# Patient Record
Sex: FEMALE | Race: WHITE | Employment: FULL TIME | URBAN - NONMETROPOLITAN AREA
[De-identification: names, ages, dates, MRNs, and addresses within clinical notes are randomized per-mention and may not be internally consistent; named-entity substitution may affect disease eponyms.]

---

## 2018-01-23 ENCOUNTER — ALLIED HEALTH/NURSE VISIT (OUTPATIENT)
Dept: FAMILY MEDICINE | Facility: OTHER | Age: 23
End: 2018-01-23
Payer: COMMERCIAL

## 2018-01-23 VITALS
HEART RATE: 111 BPM | BODY MASS INDEX: 36.07 KG/M2 | DIASTOLIC BLOOD PRESSURE: 60 MMHG | SYSTOLIC BLOOD PRESSURE: 120 MMHG | WEIGHT: 247.8 LBS

## 2018-01-23 DIAGNOSIS — N91.2 ABSENCE OF MENSTRUATION: Primary | ICD-10-CM

## 2018-01-23 LAB — BETA HCG QUAL IFA URINE: POSITIVE

## 2018-01-23 PROCEDURE — 99207 ZZC NO CHARGE NURSE ONLY: CPT

## 2018-01-23 PROCEDURE — 84703 CHORIONIC GONADOTROPIN ASSAY: CPT | Performed by: INTERNAL MEDICINE

## 2018-01-23 RX ORDER — PRENATAL VIT/IRON FUM/FOLIC AC 27MG-0.8MG
1 TABLET ORAL DAILY
COMMUNITY
End: 2018-09-10

## 2018-01-23 NOTE — MR AVS SNAPSHOT
"              After Visit Summary   1/23/2018    Mila Oden    MRN: [id number]           Patient Information     Date Of Birth          1995        Visit Information        Provider Department      1/23/2018 1:30 PM MC NURSE [facility]        Today's Diagnoses     Absence of menstruation    -  1       Follow-ups after your visit        Your next 10 appointments already scheduled     Jan 29, 2018  5:00 PM CST   [facility] Prenatal with [facility] OB [facility]   [facility]    [address] [place] [address] [phone]              Who to contact     If you have questions or need follow up information about today's clinic visit or your schedule please contact [facility] directly at [phone].  Normal or non-critical lab and imaging results will be communicated to you by LawPivothart, letter or phone within 4 business days after the clinic has received the results. If you do not hear from us within 7 days, please contact the clinic through LawPivothart or phone. If you have a critical or abnormal lab result, we will notify you by phone as soon as possible.  Submit refill requests through INcubes or call your pharmacy and they will forward the refill request to us. Please allow 3 business days for your refill to be completed.          Additional Information About Your Visit        LawPivothart Information     INcubes lets you send messages to your doctor, view your test results, renew your prescriptions, schedule appointments and more. To sign up, go to [email or web address] . Click on \"Log in\" on the left side of the screen, which will take you to the Welcome page. Then click on \"Sign up Now\" on the right side of the page.     You will be asked to enter the access code listed below, as well as some personal information. Please follow the directions to create your username and password.     Your access code is: [email or web address]  Expires: 4/23/2018  1:50 " PM     Your access code will  in 90 days. If you need help or a new code, please call your [place] clinic or [phone].        Care EveryWhere ID     This is your Care EveryWhere ID. This could be used by other organizations to access your [place] medical records  [id number]        Your Vitals Were     Pulse Last Period Breastfeeding? BMI (Body Mass Index)          111 11/15/2017 (Approximate) No 36.07 kg/m2         Blood Pressure from Last 3 Encounters:   18 120/60   16 105/65   16 108/72    Weight from Last 3 Encounters:   18 247 lb 12.8 oz (112.4 kg)   16 223 lb 1.6 oz (101.2 kg)   16 220 lb (99.8 kg)              We Performed the Following     Beta HCG qual IFA urine        Primary Care Provider Office Phone # Fax #    [facility] [phone]       [address] [facility] [address]        Equal Access to Services     SALLY WHIPPLE : Hadii aad ku hadasho Soomaali, waaxda luqadaha, qaybta kaalmada adeegyada, waxay rubioin reg morillo . So [facility] [phone].    ATENCIÓN: Si habla español, tiene a perez disposición servicios gratuitos de asistencia lingüística. Llame al [phone].    We comply with applicable federal civil rights laws and [place] laws. We do not discriminate on the basis of race, color, national origin, age, disability, sex, sexual orientation, or gender identity.            Thank you!     Thank you for choosing [facility]  for your care. Our goal is always to provide you with excellent care. Hearing back from our patients is one way we can continue to improve our services. Please take a few minutes to complete the written survey that you may receive in the mail after your visit with us. Thank you!             Your Updated Medication List - Protect others around you: Learn how to safely use, store and throw away your medicines at www.disposemymeds.org.          This list is accurate as of:  1/23/18  1:50 PM.  Always use your most recent med list.                   Brand Name Dispense Instructions for use Diagnosis    prenatal multivitamin plus iron 27-0.8 MG Tabs per tablet      Take 1 tablet by mouth daily

## 2018-01-23 NOTE — PROGRESS NOTES
Mila Oden is a 22 year old here today for a pregnancy test.  LMP: Patient's last menstrual period was 11/15/2017 (approximate).  Wt: 247 lbs 12.8 oz.    Symptoms include breast tenderness, absence of menses and nausea &/or vomiting.    Mila informed of positive pregnancy test results. VJIAY: 2018    Educational advice given: nutrition, smoking and drugs & alcohol.    Current medications reviewed: Yes    Previous pregnancy history remarkable for: None.    Plan: schedule appointment with OB Educator and/or OB class, follow-up appointment with Dr. stewart for pre- care, take multivitamin or pre-farooq vitamins and OB Education packet given.    She is to call back if she has any questions or concerns.  She is advised to notify a provider immediately if she experiences any severe cramping or abdominal pain or any vaginal bleeding.    Halle Morales RN  [facility]

## 2018-01-25 NOTE — PROGRESS NOTES
Please contact the patient and notify her of the following:  If the patient has not already been notified, please let her know that she is pregnant.    Thank you.   DO SAMREEN Hays

## 2018-01-29 ENCOUNTER — PRENATAL OFFICE VISIT (OUTPATIENT)
Dept: FAMILY MEDICINE | Facility: CLINIC | Age: 23
End: 2018-01-29
Payer: COMMERCIAL

## 2018-01-29 VITALS — HEIGHT: 69 IN | WEIGHT: 240 LBS | BODY MASS INDEX: 35.55 KG/M2

## 2018-01-29 DIAGNOSIS — Z34.90 PREGNANCY, UNSPECIFIED GESTATIONAL AGE: Primary | ICD-10-CM

## 2018-01-29 LAB
ALBUMIN UR-MCNC: NEGATIVE MG/DL
AMORPH CRY #/AREA URNS HPF: ABNORMAL /HPF
APPEARANCE UR: ABNORMAL
BILIRUB UR QL STRIP: NEGATIVE
COLOR UR AUTO: YELLOW
ERYTHROCYTE [DISTWIDTH] IN BLOOD BY AUTOMATED COUNT: 13 % (ref 10–15)
GLUCOSE UR STRIP-MCNC: NEGATIVE MG/DL
HCT VFR BLD AUTO: 43.8 % (ref 35–47)
HGB BLD-MCNC: 14.5 G/DL (ref 11.7–15.7)
HGB UR QL STRIP: NEGATIVE
KETONES UR STRIP-MCNC: NEGATIVE MG/DL
LEUKOCYTE ESTERASE UR QL STRIP: NEGATIVE
MCH RBC QN AUTO: 30.4 PG (ref 26.5–33)
MCHC RBC AUTO-ENTMCNC: 33.1 G/DL (ref 31.5–36.5)
MCV RBC AUTO: 92 FL (ref 78–100)
MUCOUS THREADS #/AREA URNS LPF: PRESENT /LPF
NITRATE UR QL: NEGATIVE
PH UR STRIP: 6 PH (ref 5–7)
PLATELET # BLD AUTO: 297 10E9/L (ref 150–450)
RBC # BLD AUTO: 4.77 10E12/L (ref 3.8–5.2)
RBC #/AREA URNS AUTO: 1 /HPF (ref 0–2)
SOURCE: ABNORMAL
SP GR UR STRIP: 1.01 (ref 1–1.03)
SQUAMOUS #/AREA URNS AUTO: 2 /HPF (ref 0–1)
UROBILINOGEN UR STRIP-MCNC: 0 MG/DL (ref 0–2)
WBC # BLD AUTO: 11.9 10E9/L (ref 4–11)
WBC #/AREA URNS AUTO: 1 /HPF (ref 0–2)

## 2018-01-29 PROCEDURE — 86901 BLOOD TYPING SEROLOGIC RH(D): CPT | Performed by: FAMILY MEDICINE

## 2018-01-29 PROCEDURE — 86900 BLOOD TYPING SEROLOGIC ABO: CPT | Performed by: FAMILY MEDICINE

## 2018-01-29 PROCEDURE — 86762 RUBELLA ANTIBODY: CPT | Performed by: FAMILY MEDICINE

## 2018-01-29 PROCEDURE — 87340 HEPATITIS B SURFACE AG IA: CPT | Performed by: FAMILY MEDICINE

## 2018-01-29 PROCEDURE — 86850 RBC ANTIBODY SCREEN: CPT | Performed by: FAMILY MEDICINE

## 2018-01-29 PROCEDURE — 81001 URINALYSIS AUTO W/SCOPE: CPT | Performed by: FAMILY MEDICINE

## 2018-01-29 PROCEDURE — 36415 COLL VENOUS BLD VENIPUNCTURE: CPT | Performed by: FAMILY MEDICINE

## 2018-01-29 PROCEDURE — 85027 COMPLETE CBC AUTOMATED: CPT | Performed by: FAMILY MEDICINE

## 2018-01-29 PROCEDURE — 87389 HIV-1 AG W/HIV-1&-2 AB AG IA: CPT | Performed by: FAMILY MEDICINE

## 2018-01-29 PROCEDURE — 86780 TREPONEMA PALLIDUM: CPT | Performed by: FAMILY MEDICINE

## 2018-01-29 PROCEDURE — 99207 ZZC NO CHARGE NURSE ONLY: CPT

## 2018-01-29 NOTE — MR AVS SNAPSHOT
After Visit Summary   1/29/2018    Mila Oden    MRN: [id number]           Patient Information     Date Of Birth          1995        Visit Information        Provider Department      1/29/2018 5:00 PM NL OB INTAKE [facility]        Today's Diagnoses     Pregnancy, unspecified gestational age    -  1       Follow-ups after your visit        Your next 10 appointments already scheduled     Mar 05, 2018  3:00 PM CST   ESTABLISHED PRENATAL with Candice Rivero MD   [address] [address]   [phone]            Apr 02, 2018  3:00 PM CDT   ESTABLISHED PRENATAL with Candice Rivero MD   [facility] ([address] [address]   [phone]            May 07, 2018  2:00 PM CDT   ESTABL[facility] PRENATAL [facility]   [facility]    [address] [place] [address]   [phone]            Jun 04, 2018  2:00 PM CDT   ESTABLISHED PRENATAL with Candice Rivero MD   [facility] ([address] [address]   [phone]            Jun 18, 2018  2:00 PM CDT   ESTABLISHED PRENATAL with Candice Rivero MD   [facility] ([address] [address]   [phone]            Jul 02, 2018  2:00 PM CDT   ESTABLISHED PRENATAL with Candice Rivero MD   [facility] ([address] [address]   [phone]              Who to contact     If you have questions or need follow up information about today's clinic visit or your schedule please contact [facility] directly at [phone].  Normal or non-critical  "lab and imaging results will be communicated to you by MyChart, letter or phone within 4 business days after the clinic has received the results. If you do not hear from us within 7 days, please contact the clinic through [email or web address] or phone. If you have a critical or abnormal lab result, we will notify you by phone as soon as possible.  Submit refill requests through ListRunner or call your pharmacy and they will forward the refill request to us. Please allow 3 business days for your refill to be completed.          Additional Information About Your Visit        ListRunner Information     ListRunner lets you send messages to your doctor, view your test results, renew your prescriptions, schedule appointments and more. To sign up, go to [email or web address] . Click on \"Log in\" on the left side of the screen, which will take you to the Welcome page. Then click on \"Sign up Now\" on the right side of the page.     You will be asked to enter the access code listed below, as well as some personal information. Please follow the directions to create your username and password.     Your access code is: [email or web address]  Expires: 2018  1:50 PM     Your access code will  in 90 days. If you need help or a new code, please call your [place] clinic or [phone].        Care EveryWhere ID     This is your Care EveryWhere ID. This could be used by other organizations to access your [place] medical records  [id number]        Your Vitals Were     Height Last Period BMI (Body Mass Index)             5' 9\" (1.753 m) 11/15/2017 (Approximate) 35.44 kg/m2          Blood Pressure from Last 3 Encounters:   18 108/54   18 116/58   18 120/60    Weight from Last 3 Encounters:   18 244 lb 9.6 oz (110.9 kg)   18 242 lb 8 oz (110 kg)   18 240 lb (108.9 kg)              We Performed the Following     *UA reflex to Microscopic and Culture ([place]; [facility]; [facility]; [facility]; [facility] " [facility]; [facility]; [place]; [place])     ABO/Rh type and screen     Anti treponema EIA     CBC WITH PLATELETS     HEPATITIS B SURFACE ANTIGEN     HIV Antigen Antibody Combo     Rubella Antibody IgG Quantitative        Primary Care Provider Office Phone # Fax #    [facility] [phone] [address] [place] [address]        Equal Access to Services     DAPHNIE WHIPPLE : Hadii aad ku hadasho Soomaali, waaxda luqadaha, qaybta kaalmada adeegyada, waxay idiin hayaan adeeg kharash lamarlo . So [facility] [phone].    ATENCIÓN: Si habla español, tiene a perez disposición servicios gratuitos de asistencia lingüística. Fabrizioame al [phone].    We comply with applicable federal civil rights laws and [place] laws. We do not discriminate on the basis of race, color, national origin, age, disability, sex, sexual orientation, or gender identity.            Thank you!     Thank you for choosing [facility]  for your care. Our goal is always to provide you with excellent care. Hearing back from our patients is one way we can continue to improve our services. Please take a few minutes to complete the written survey that you may receive in the mail after your visit with us. Thank you!             Your Updated Medication List - Protect others around you: Learn how to safely use, store and throw away your medicines at www.disposemymeds.org.          This list is accurate as of 1/29/18 11:59 PM.  Always use your most recent med list.                   Brand Name Dispense Instructions for use Diagnosis    prenatal multivitamin plus iron 27-0.8 MG Tabs per tablet      Take 1 tablet by mouth daily

## 2018-01-29 NOTE — NURSING NOTE
"Reports last normal period was between 11/5 - 11/20.  Around 12/15, she had intermittent spotting for one week - 10 days.  During that time, she describes the bleeding as heavy (needing to wear a pad) a couple of days for approx. 5 hours each day.  She had \"mild\" cramping with the spotting.  Since the end of Dec., she had one episode of spotting one day on the toilet tissue when wiping.  She has occasional, mild pain on her left side.  She has been constipated.  Suggested switching to prenatal gummies or chewables.  She feels nauseous and has breast tenderness.    "

## 2018-01-29 NOTE — PROGRESS NOTES
Baby Friendly Education    Discussed benefits of breastfeeding for mom and baby, risks of artificial feeding, importance of exclusive breastfeeding for the first six months, non pharmacologic pain relief methods for labor, importance of early skin to skin contact, importance of early initiation of breastfeeding, importance of rooming in, importance of early and frequent nursing and effective positioning and attachment.        1. Have you had chicken pox?   Yes    Genetic Screening    Has the patient, baby's father, or anyone in either family had:     1. Thalassemia and and MCV result less than 80?No   2.  Neural tube defect? No   3.  Congenital heart defect?No   4. Down's syndrome?No   5.  Terrell-Sachs disease?No   6. Sickle Cell disease or trait?No   7. Hemophilia or other inherited problems of blood?No   8. Muscular dystropy?No   9.  Cystic fibrosis?No  10. [place]'s Chorea?No  11. Mental Retardation or autism?  No         If yes, was the person tested for Fragile X?   12. Any other inherited genetic or chromosomal disorders?No  13. Metabolic disorders such as diabetes or PKU?No  14. A child born with defects not listed above?No  15. Recurrent pregnancy loss or stillbirth?No  16.  Has the patient had any medications/street drugs/alcohol since her last menstrual period?No  18.  Does the patient or baby's father have any other genetic risks. No    Fei has twin cousins on paternal side.

## 2018-01-30 LAB
HBV SURFACE AG SERPL QL IA: NONREACTIVE
HIV 1+2 AB+HIV1 P24 AG SERPL QL IA: NONREACTIVE
RUBV IGG SERPL IA-ACNC: 135 IU/ML
T PALLIDUM IGG+IGM SER QL: NEGATIVE

## 2018-01-30 NOTE — PROGRESS NOTES
Notify her that her labs are normal so far, will review in more detail at her first OB visit.   Candice Rivero MD

## 2018-01-31 ENCOUNTER — RADIANT APPOINTMENT (OUTPATIENT)
Dept: ULTRASOUND IMAGING | Facility: OTHER | Age: 23
End: 2018-01-31
Attending: FAMILY MEDICINE
Payer: COMMERCIAL

## 2018-01-31 DIAGNOSIS — Z34.90 PREGNANCY: ICD-10-CM

## 2018-01-31 PROCEDURE — 76801 OB US < 14 WKS SINGLE FETUS: CPT

## 2018-01-31 NOTE — PROGRESS NOTES
Her other labs are also normal, will review in more detail at her first OB visit.   Candice Rivero MD

## 2018-02-01 LAB
ABO + RH BLD: NORMAL
ABO + RH BLD: NORMAL
BLD GP AB SCN SERPL QL: NORMAL
BLOOD BANK CMNT PATIENT-IMP: NORMAL
SPECIMEN EXP DATE BLD: NORMAL

## 2018-02-05 ENCOUNTER — PRENATAL OFFICE VISIT (OUTPATIENT)
Dept: FAMILY MEDICINE | Facility: CLINIC | Age: 23
End: 2018-02-05
Payer: COMMERCIAL

## 2018-02-05 VITALS
SYSTOLIC BLOOD PRESSURE: 116 MMHG | RESPIRATION RATE: 16 BRPM | WEIGHT: 242.5 LBS | BODY MASS INDEX: 35.81 KG/M2 | DIASTOLIC BLOOD PRESSURE: 58 MMHG | HEART RATE: 80 BPM | OXYGEN SATURATION: 98 % | TEMPERATURE: 98.8 F

## 2018-02-05 DIAGNOSIS — Z34.01 ENCOUNTER FOR SUPERVISION OF NORMAL FIRST PREGNANCY IN FIRST TRIMESTER: Primary | ICD-10-CM

## 2018-02-05 DIAGNOSIS — Z23 NEED FOR PROPHYLACTIC VACCINATION AND INOCULATION AGAINST INFLUENZA: ICD-10-CM

## 2018-02-05 DIAGNOSIS — Z12.4 SCREENING FOR MALIGNANT NEOPLASM OF CERVIX: ICD-10-CM

## 2018-02-05 DIAGNOSIS — N76.0 BV (BACTERIAL VAGINOSIS): ICD-10-CM

## 2018-02-05 DIAGNOSIS — B96.89 BV (BACTERIAL VAGINOSIS): ICD-10-CM

## 2018-02-05 LAB
SPECIMEN SOURCE: ABNORMAL
WET PREP SPEC: ABNORMAL

## 2018-02-05 PROCEDURE — 87591 N.GONORRHOEAE DNA AMP PROB: CPT | Performed by: FAMILY MEDICINE

## 2018-02-05 PROCEDURE — 87210 SMEAR WET MOUNT SALINE/INK: CPT | Performed by: FAMILY MEDICINE

## 2018-02-05 PROCEDURE — 90686 IIV4 VACC NO PRSV 0.5 ML IM: CPT | Performed by: FAMILY MEDICINE

## 2018-02-05 PROCEDURE — 90471 IMMUNIZATION ADMIN: CPT | Performed by: FAMILY MEDICINE

## 2018-02-05 PROCEDURE — G0145 SCR C/V CYTO,THINLAYER,RESCR: HCPCS | Performed by: FAMILY MEDICINE

## 2018-02-05 PROCEDURE — 87491 CHLMYD TRACH DNA AMP PROBE: CPT | Performed by: FAMILY MEDICINE

## 2018-02-05 PROCEDURE — 99207 ZZC FIRST OB VISIT: CPT | Performed by: FAMILY MEDICINE

## 2018-02-05 ASSESSMENT — PAIN SCALES - GENERAL: PAINLEVEL: NO PAIN (0)

## 2018-02-05 NOTE — LETTER
February 10, 2018      Mila J Cecille  [address] [facility] [address]    Dear ,      I am happy to inform you that your recent cervical cancer screening test (PAP smear) was normal.      Preventative screenings such as this help to ensure your health for years to come. You should repeat a pap smear in 3 years, unless otherwise directed.      You will still need to return to the clinic every year for your annual exam and other preventive tests.     Please contact the clinic at [phone] if you have further questions.       Sincerely,      Candice Rivero MD/mk

## 2018-02-05 NOTE — MR AVS SNAPSHOT
After Visit Summary   2/5/2018    Mila Oden    MRN: [id number]           Patient Information     Date Of Birth          1995        Visit Information        Provider Department      2/5/2018 3:45 PM Candice Rivero MD [facility]        Today's Diagnoses     Encounter for supervision of normal first pregnancy in first trimester    -  1    Need for prophylactic vaccination and inoculation against influenza        Screening for malignant neoplasm of cervix           Follow-ups after your visit        Your next 10 appointments already scheduled     Mar 05, 2018  3:00 PM CST   ESTABLISHED PRENATAL with Candice [facility]o MD   [facility]    [address] [place] [address]   [phone]            Apr 02, 2018  3:00 PM CDT   ESTABLISHED PRENATAL with Candice Rivero MD   [facility] ([address] [address]   [phone]            May 07, 2018  2:00 PM CDT   ESTABLISHED PRENATAL with Candice iRvero MD   [facility]    [address] [place] [address]   [phone]            Jun 04, 2018  2:00 PM CDT   ESTABLISHED PRENATAL with Candice iRvero MD   [facility]    [address] [place] [address]   [phone]            Jun 18, 2018  2:00 PM CDT   ESTABLISHED PRENATAL with Candice Rivero MD   [facility]    [address] [place] [address]   [phone]            Jul 02, 2018  2:00 PM CDT   ESTABLISHED PRENATAL with Candice Rivero MD   [facility]    [address] [place] [address]   [phone]              Who to contact     If you have  "questions or need follow up information about today's clinic visit or your schedule please contact [facility] directly at [phone].  Normal or non-critical lab and imaging results will be communicated to you by MyChart, letter or phone within 4 business days after the clinic has received the results. If you do not hear from us within 7 days, please contact the clinic through Indiegogohart or phone. If you have a critical or abnormal lab result, we will notify you by phone as soon as possible.  Submit refill requests through Jacked or call your pharmacy and they will forward the refill request to us. Please allow 3 business days for your refill to be completed.          Additional Information About Your Visit        IndiegogoharSuso Information     Jacked lets you send messages to your doctor, view your test results, renew your prescriptions, schedule appointments and more. To sign up, go to [email or web address] . Click on \"Log in\" on the left side of the screen, which will take you to the Welcome page. Then click on \"Sign up Now\" on the right side of the page.     You will be asked to enter the access code listed below, as well as some personal information. Please follow the directions to create your username and password.     Your access code is: [email or web address]  Expires: 2018  1:50 PM     Your access code will  in 90 days. If you need help or a new code, please call your [place] clinic or [phone].        Care EveryWhere ID     This is your Care EveryWhere ID. This could be used by other organizations to access your [place] medical records  [id number]        Your Vitals Were     Pulse Temperature Respirations Last Period Pulse Oximetry BMI (Body Mass Index)    80 98.8  F (37.1  C) (Tympanic) 16 11/15/2017 (Approximate) 98% 35.81 kg/m2       Blood Pressure from Last 3 Encounters:   18 116/58   18 120/60   16 105/65    Weight from Last 3 Encounters:   18 242 lb 8 oz " (110 kg)   01/29/18 240 lb (108.9 kg)   01/23/18 247 lb 12.8 oz (112.4 kg)              We Performed the Following     CHLAMYDIA TRACHOMATIS PCR     FLU VAC, SPLIT VIRUS IM > 3 YO (QUADRIVALENT) [02260]     NEISSERIA GONORRHOEA PCR     Pap imaged thin layer screen only - recommended age 21 - 24 years     Vaccine Administration, Initial [58101]     Wet prep        Primary Care Provider Office Phone # Fax #    [facility] [phone]       [address] [facility] [address]        Equal Access to Services     [facility] SARABJIT : Hadii aad ku hadasho Somateo, waaxda luqadaha, qaybta kaalmada freddy, minna morillo . So [facility] [phone].    ATENCIÓN: Si habla español, tiene a perez disposición servicios gratuitos de asistencia lingüística. Llame al [phone].    We comply with applicable federal civil rights laws and [place] laws. We do not discriminate on the basis of race, color, national origin, age, disability, sex, sexual orientation, or gender identity.            Thank you!     Thank you for choosing [facility]  for your care. Our goal is always to provide you with excellent care. Hearing back from our patients is one way we can continue to improve our services. Please take a few minutes to complete the written survey that you may receive in the mail after your visit with us. Thank you!             Your Updated Medication List - Protect others around you: Learn how to safely use, store and throw away your medicines at www.disposemymeds.org.          This list is accurate as of 2/5/18  9:14 PM.  Always use your most recent med list.                   Brand Name Dispense Instructions for use Diagnosis    prenatal multivitamin plus iron 27-0.8 MG Tabs per tablet      Take 1 tablet by mouth daily

## 2018-02-05 NOTE — PROGRESS NOTES

## 2018-02-05 NOTE — PROGRESS NOTES
Mila Oden is a 22 year old,  female who presents with her significant other Raysa for 1st OB visit.  She has talked with OB Ed prior to today's visit.  She is 13w3d by early U/S. She thinks her LMP was on 11/15 but she says this could have been 5 days earlier or later. This causes a 12 day discrepancy between her U/S VIJAY and LMP VIJAY, so u/s VIJAY Chosen.  She is feeling well.      Raysa has a child from prior relationship, [place] and they are working to get partial custody.      Past Medical History:   Diagnosis Date     Otitis media      I reviewed her previous OB history:  Obstetric History       T0      L0     SAB0   TAB0   Ectopic0   Multiple0   Live Births0       # Outcome Date GA Lbr Sukumar/2nd Weight Sex Delivery Anes PTL Lv   1 Current               Obstetric Comments   EDC 8/10/2018 based on early u/s.  Parenting with Fei.   .    Past Medical History:   Diagnosis Date     Otitis media       Patient Active Problem List    Diagnosis Date Noted     Encounter for supervision of normal first pregnancy in first trimester 2018     Priority: Medium      O:  Vitals noted.  Patient alert, oriented, and in no acute distress.   Eyes: PERRLA, EOMI.  Ears:  Canals clear, TM's nl bilaterally.  No erythema or fluid.   Nares patent without inflammation or drainage.   Oral:  Oropharynx nl without erythema, exudate, mass or other lesions.   Neck:  Supple without lymphadenopathy, JVD or masses.  CV:  RRR without murmur.  Respiratory:  Lungs clear to auscultation bilaterally.  Breasts:  not examined today.   Abdomen:  Soft, nontender, nondistended with good bowel sounds and no masses or hepatosplenomegaly.  Uterus is not palpable is the abdomen.   Fetal heart tones were heard as noted in OB flowsheet.    Pelvic: Vaginal exam reveals normal external and internal genitalia. There is a creamy, slightly frothy, white discharge suspicious for BV. Cervix is closed, long and thick.   No lesions or abnormalities seen.  PAP smear obtained without incident. Bimanual exam reveals a nontender, gravid uterus consistant with 12-14 weeks with no CMT.  No adnexal masses or tenderness.     Extremities are normal without edema or lesions.     Diagnostics:  Orders Placed This Encounter   Procedures     FLU VAC, SPLIT VIRUS IM > 3 YO (QUADRIVALENT) [38824]     Vaccine Administration, Initial [84476]     Pap imaged thin layer screen only - recommended age 21 - 24 years      Routine labs were sent, including wet prep, gen probe and pap.    A:      ICD-10-CM    1. Encounter for supervision of normal first pregnancy in first trimester Z34.01 NEISSERIA GONORRHOEA PCR     CHLAMYDIA TRACHOMATIS PCR     Wet prep   2. Need for prophylactic vaccination and inoculation against influenza Z23 FLU VAC, SPLIT VIRUS IM > 3 YO (QUADRIVALENT) [97049]     Vaccine Administration, Initial [41089]   3. Screening for malignant neoplasm of cervix Z12.4 Pap imaged thin layer screen only - recommended age 21 - 24 years     P:  There is a 12 day discrepancy between her LMP and her early U/S. She will go by her early U/S date since her LMP was approximate.    Influenza Vaccination given today.  Labs collected, will notify with results and discuss further evaluation/ treatment if needed at that time.  Discussed AFP - patient and SO will consider AFP and notify me at her next visit.   Patient is RH positive - will NOT need Rhogam.   Discussed routine pregnancy care, schedule of visits, nutrition, exercise, travel, answered questions.  Will follow up in 4 weeks or sooner with any concerns.  Will call with lab results.    This document serves as a record of services personally performed by Candice Rivero MD. It was created on their behalf by Nancy Enciso, a trained medical scribe. The creation of this record is based on the provider's personal observations and the statements of the patient. This document has been checked and  approved by the attending provider.    Candice Rivero MD

## 2018-02-05 NOTE — NURSING NOTE
"Chief Complaint   Patient presents with     Prenatal Care     1st ob 11w5d       Initial /58 (BP Location: Left arm, Patient Position: Chair, Cuff Size: Adult Large)  Pulse 80  Temp 98.8  F (37.1  C) (Tympanic)  Resp 16  Wt 242 lb 8 oz (110 kg)  LMP 11/15/2017 (Approximate)  SpO2 98%  BMI 35.81 kg/m2 Estimated body mass index is 35.81 kg/(m^2) as calculated from the following:    Height as of 1/29/18: 5' 9\" (1.753 m).    Weight as of this encounter: 242 lb 8 oz (110 kg).  Medication Reconciliation: complete   Health Maintenance Due   Topic Date Due     CHLAMYDIA SCREENING  1995     HPV IMMUNIZATION (1 of 3 - Female 3 Dose Series) 09/25/2005     PAP SCREENING Q3 YR (SYSTEM ASSIGNED)  09/25/2015     INFLUENZA VACCINE (SYSTEM ASSIGNED)  09/01/2017     TETANUS IMMUNIZATION (SYSTEM ASSIGNED)  09/04/2017     MATERNAL SCREENING  02/28/2018     Rosalina Lane [facility]    Prior to injection verified patient identity using patient's name and date of birth.   Patient instructed to remain in clinic for 20 minutes afterwards and to report any adverse reaction to me immediately.  Rosalina Lane [facility]    "

## 2018-02-06 ENCOUNTER — TELEPHONE (OUTPATIENT)
Dept: FAMILY MEDICINE | Facility: CLINIC | Age: 23
End: 2018-02-06

## 2018-02-06 LAB
C TRACH DNA SPEC QL NAA+PROBE: NEGATIVE
N GONORRHOEA DNA SPEC QL NAA+PROBE: NEGATIVE
SPECIMEN SOURCE: NORMAL
SPECIMEN SOURCE: NORMAL

## 2018-02-06 RX ORDER — METRONIDAZOLE 500 MG/1
500 TABLET ORAL 2 TIMES DAILY
Qty: 14 TABLET | Refills: 0 | Status: SHIPPED | OUTPATIENT
Start: 2018-02-06 | End: 2018-02-13

## 2018-02-06 NOTE — TELEPHONE ENCOUNTER
----- Message from Candice Rivero MD sent at 2/6/2018 11:18 AM CST -----  Notify Mila that she DOES have a vaginal bacterial infection, and I'm sending her a Rx for Flagyl.  No sex until this is completed, but her partner does NOT need any treatment, as this is NOT a sexually transmitted disease.   Candice Rivero MD

## 2018-02-06 NOTE — TELEPHONE ENCOUNTER
Mila returns call and message is relayed.  Pt states understanding and had no other questions or concerns.

## 2018-02-06 NOTE — PROGRESS NOTES
Notify Mila that she DOES have a vaginal bacterial infection, and I'm sending her a Rx for Flagyl.  No sex until this is completed, but her partner does NOT need any treatment, as this is NOT a sexually transmitted disease.   Candice Rivero MD

## 2018-02-06 NOTE — TELEPHONE ENCOUNTER
Lm for pt to call clinic back, see msg below. Rx was sent to [facility] pharmacy. Magda Morales CMA

## 2018-02-07 ENCOUNTER — PRENATAL OFFICE VISIT (OUTPATIENT)
Dept: FAMILY MEDICINE | Facility: CLINIC | Age: 23
End: 2018-02-07
Payer: COMMERCIAL

## 2018-02-07 ENCOUNTER — TELEPHONE (OUTPATIENT)
Dept: FAMILY MEDICINE | Facility: CLINIC | Age: 23
End: 2018-02-07

## 2018-02-07 VITALS
HEART RATE: 82 BPM | DIASTOLIC BLOOD PRESSURE: 54 MMHG | SYSTOLIC BLOOD PRESSURE: 108 MMHG | BODY MASS INDEX: 36.12 KG/M2 | WEIGHT: 244.6 LBS | TEMPERATURE: 98.1 F | OXYGEN SATURATION: 99 %

## 2018-02-07 DIAGNOSIS — O20.9 VAGINAL BLEEDING IN PREGNANCY, FIRST TRIMESTER: Primary | ICD-10-CM

## 2018-02-07 LAB
COPATH REPORT: NORMAL
PAP: NORMAL

## 2018-02-07 PROCEDURE — 99207 ZZC PRENATAL VISIT: CPT | Performed by: FAMILY MEDICINE

## 2018-02-07 ASSESSMENT — PAIN SCALES - GENERAL: PAINLEVEL: NO PAIN (0)

## 2018-02-07 NOTE — TELEPHONE ENCOUNTER
: 1995  PHONE #'s: [phone] (home)     PRESENTING PROBLEM:  Started bleeding vaginally at 2130 yesterday. Still going on now. Patient said she didn't note any bleeding on underwear or pad but bleed with each urination. She needed a few tissues each urination to clean up blood. Was diagnosed with bacterial vaginosis 2 days ago and hasn't picked up antibiotic yet, she plans to pick it up today. Had some bleeding a few weeks ago that lasted 3 hours then stopped. Had US 18. She is 13 weeks. Patient was told to call Dr. Rivero with any questions or concerns and would like a call back from her. Would you like her seen today?    Last exam/Tx:  18    Await call-back and recommendation from provider.  If further questions/concerns or if Sx do not improve, worsen or new Sx develop, call your PCP or [facility] as soon as possible.    NOTES:  Disposition was determined by the first positive assessment question, therefore all previous assessment questions were negative.  Informed to check provider manual or call insurance company to assure coverage.    Guideline used: bleeding  Telephone Triage for Obstetrics and Gynecology, Karen Garrett and Hannah Farnsworth RN

## 2018-02-07 NOTE — TELEPHONE ENCOUNTER
Per Candice Rivero MD patient should be seen today. Patient was contact and appointment made for today at 1:30pm.  Bailey Mckeon CMA

## 2018-02-07 NOTE — MR AVS SNAPSHOT
After Visit Summary   2/7/2018    Mila Oden    MRN: [id number]           Patient Information     Date Of Birth          1995        Visit Information        Provider Department      2/7/2018 1:30 PM Candice Rivero MD [facility]        Today's Diagnoses     Vaginal bleeding in pregnancy, first trimester    -  1       Follow-ups after your visit        Your next 10 appointments already scheduled     Mar 05, 2018  3:00 PM CST   ESTABLISHED PRENATAL with Candice Rivero MD   [facility] ([address] [address]   [phone]            Apr 02, 2018  3:00 PM CDT   ESTABLISHED PRENATAL with Candice Rivero MD   [facility] ([address] [address]   [phone]            May 07, 2018  2:00 PM CDT   ESTABLISHED PRENATAL with Canidce Rivero MD   [facility] ([address] [address]   [phone]            Jun 04, 2018  2:00 PM CDT   ESTABLISHED PRENATAL with Candice Rivero MD   [facility] ([address] [address]   [phone]            Jun 18, 2018  2:00 PM CDT   ESTABLISHED PRENATAL with Candice Rivero MD   [facility] ([address] [address]   [phone]            Jul 02, 2018  2:00 PM CDT   ESTABLISHED PRENATAL with Candice Rivero MD   [facility] ([address] [address]   [phone]              Who to contact     If you have questions or need follow up information about today's clinic visit or your schedule please contact [facility] directly at  "[phone].  Normal or non-critical lab and imaging results will be communicated to you by Voter Gravityhart, letter or phone within 4 business days after the clinic has received the results. If you do not hear from us within 7 days, please contact the clinic through Voter Gravityhart or phone. If you have a critical or abnormal lab result, we will notify you by phone as soon as possible.  Submit refill requests through HITbills or call your pharmacy and they will forward the refill request to us. Please allow 3 business days for your refill to be completed.          Additional Information About Your Visit        Voter Gravityhart Information     HITbills lets you send messages to your doctor, view your test results, renew your prescriptions, schedule appointments and more. To sign up, go to [email or web address] . Click on \"Log in\" on the left side of the screen, which will take you to the Welcome page. Then click on \"Sign up Now\" on the right side of the page.     You will be asked to enter the access code listed below, as well as some personal information. Please follow the directions to create your username and password.     Your access code is: [email or web address]  Expires: 2018  1:50 PM     Your access code will  in 90 days. If you need help or a new code, please call your [facility] clinic or [phone].        Care EveryWhere ID     This is your Care EveryWhere ID. This could be used by other organizations to access your [facility] medical records  [id number]        Your Vitals Were     Pulse Temperature Last Period Pulse Oximetry BMI (Body Mass Index)       82 98.1  F (36.7  C) (Temporal) 11/15/2017 (Approximate) 99% 36.12 kg/m2        Blood Pressure from Last 3 Encounters:   18 108/54   18 116/58   18 120/60    Weight from Last 3 Encounters:   18 244 lb 9.6 oz (110.9 kg)   18 242 lb 8 oz (110 kg)   18 240 lb (108.9 kg)              Today, you had the following     No orders found for display    "    Primary Care Provider Office Phone # Fax #    Candice Irasema Rivero [phone] [address] [place] [address]        Equal Access to Services     SALLY WHIPPLE : Hadii jessika ku leonelo Somireyaali, waaxda luqadaha, qaybta kaalmada adeshayy, minna nelson laGeovanidayne garner. So [facility] [phone].    ATENCIÓN: Si habla español, tiene a perez disposición servicios gratuitos de asistencia lingüística. Llame al [phone].    We comply with applicable federal civil rights laws and [place] laws. We do not discriminate on the basis of race, color, national origin, age, disability, sex, sexual orientation, or gender identity.            Thank you!     Thank you for choosing [facility]  for your care. Our goal is always to provide you with excellent care. Hearing back from our patients is one way we can continue to improve our services. Please take a few minutes to complete the written survey that you may receive in the mail after your visit with us. Thank you!             Your Updated Medication List - Protect others around you: Learn how to safely use, store and throw away your medicines at www.disposemymeds.org.          This list is accurate as of 2/7/18  1:46 PM.  Always use your most recent med list.                   Brand Name Dispense Instructions for use Diagnosis    metroNIDAZOLE 500 MG tablet    FLAGYL    14 tablet    Take 1 tablet (500 mg) by mouth 2 times daily for 7 days    BV (bacterial vaginosis)       prenatal multivitamin plus iron 27-0.8 MG Tabs per tablet      Take 1 tablet by mouth daily

## 2018-02-07 NOTE — PROGRESS NOTES
The patient was seen for her 1st OB appointment at 13w3d, 2 days ago. At that time, she had reported that she experienced some vaginal bleeding following sex prior to the visit, but it had resolved. She had a normal U/S on 1/31/2018. While at her 1st OB visit, a wet prep was collected that was suggestive of Bacterial Vaginosis so she was prescribed Flagyl. Patient has not picked that up yet. Patient reports that last night around 2130, she began bleeding vaginally - after urination, mostly with wiping and in the toilet, that has persisted since onset. The patient says that her bleeding has improved slightly since onset, but not much. She denies any cramping or other associated symptoms.     O: Pelvic: Vaginal exam normal external and internal genitalia. There is some blood smeared very lightly around the inside walls of the vagina. Cervix is closed, long and thick.  No lesions or abnormalities seen.  No blood from the os. The os appears closed. No discharge/ odor. Bimanual exam reveals a nontender gravid uterus at 12-14 weeks with no CMT.  No adnexal masses or tenderness.      Fetal heart tones are heard so I reassured the patient. Discussed with the patient that I think she is bleeding due to her pelvic exam and PAP that were done at her 1st OB visit and the blood is coming out when she urinates. Discussed that she should continue to monitor her symptoms, if the bleeding worsens she will notify me again. Patient is in agreement with this treatment plan.   Encouraged her to  her Flagyl to treat the Bacterial Vaginosis.  Reportable signs and symptoms discussed.  Follow up in 4 weeks or sooner if needed.   Candice Rivero MD

## 2018-02-07 NOTE — NURSING NOTE
"Chief Complaint   Patient presents with     Prenatal Care     vaginal bleeding with urination       Initial /54  Pulse 82  Temp 98.1  F (36.7  C) (Temporal)  Wt 244 lb 9.6 oz (110.9 kg)  LMP 11/15/2017 (Approximate)  SpO2 99%  BMI 36.12 kg/m2 Estimated body mass index is 36.12 kg/(m^2) as calculated from the following:    Height as of 1/29/18: 5' 9\" (1.753 m).    Weight as of this encounter: 244 lb 9.6 oz (110.9 kg).  Medication Reconciliation: complete   Bailey Mckeon, CMA    "

## 2018-03-09 ENCOUNTER — PRENATAL OFFICE VISIT (OUTPATIENT)
Dept: FAMILY MEDICINE | Facility: CLINIC | Age: 23
End: 2018-03-09
Payer: COMMERCIAL

## 2018-03-09 VITALS
WEIGHT: 245.8 LBS | BODY MASS INDEX: 36.3 KG/M2 | OXYGEN SATURATION: 97 % | HEART RATE: 85 BPM | SYSTOLIC BLOOD PRESSURE: 102 MMHG | DIASTOLIC BLOOD PRESSURE: 50 MMHG | TEMPERATURE: 97.9 F

## 2018-03-09 DIAGNOSIS — Z34.02 ENCOUNTER FOR SUPERVISION OF NORMAL FIRST PREGNANCY IN SECOND TRIMESTER: Primary | ICD-10-CM

## 2018-03-09 PROCEDURE — 99207 ZZC PRENATAL VISIT: CPT | Performed by: FAMILY MEDICINE

## 2018-03-09 ASSESSMENT — PAIN SCALES - GENERAL: PAINLEVEL: NO PAIN (0)

## 2018-03-09 NOTE — NURSING NOTE
"Chief Complaint   Patient presents with     Prenatal Care       Initial /50  Pulse 85  Temp 97.9  F (36.6  C) (Temporal)  Wt 245 lb 12.8 oz (111.5 kg)  LMP 11/15/2017 (Approximate)  SpO2 97%  BMI 36.3 kg/m2 Estimated body mass index is 36.3 kg/(m^2) as calculated from the following:    Height as of 1/29/18: 5' 9\" (1.753 m).    Weight as of this encounter: 245 lb 12.8 oz (111.5 kg).  Medication Reconciliation: complete   Bailey Mckeon CMA    "

## 2018-03-12 NOTE — PROGRESS NOTES
Doing well overall.  She did continue to have some spotting after her last visit until about 1 week ago.  No heavy bleeding.   Discussed afp, declined. Will set up routine sono at 20 weeks.   She has also been having some right ear pain for about the past week.  Exam is normal on the right but has a small amount of clear fluid behind left TM.  Canal clear. Discussed that this is common in pregnancy with ETD.    Discussed quickening, expect fetal movement by 20 weeks.   Discussed common 2nd trimester symptoms.   Discussed  labor.   Will f/u in 4 weeks or sooner with any concerns.   Candice Rivero MD

## 2018-03-13 ENCOUNTER — OFFICE VISIT (OUTPATIENT)
Dept: FAMILY MEDICINE | Facility: OTHER | Age: 23
End: 2018-03-13
Payer: COMMERCIAL

## 2018-03-13 VITALS
BODY MASS INDEX: 36.3 KG/M2 | WEIGHT: 245.8 LBS | TEMPERATURE: 97.9 F | SYSTOLIC BLOOD PRESSURE: 112 MMHG | RESPIRATION RATE: 12 BRPM | DIASTOLIC BLOOD PRESSURE: 60 MMHG | HEART RATE: 76 BPM

## 2018-03-13 DIAGNOSIS — J01.90 ACUTE SINUSITIS WITH SYMPTOMS > 10 DAYS: Primary | ICD-10-CM

## 2018-03-13 PROCEDURE — 99213 OFFICE O/P EST LOW 20 MIN: CPT | Performed by: PHYSICIAN ASSISTANT

## 2018-03-13 RX ORDER — CEPHALEXIN 500 MG/1
500 CAPSULE ORAL 3 TIMES DAILY
Qty: 30 CAPSULE | Refills: 0 | Status: SHIPPED | OUTPATIENT
Start: 2018-03-13 | End: 2018-05-07

## 2018-03-13 ASSESSMENT — PAIN SCALES - GENERAL: PAINLEVEL: MODERATE PAIN (5)

## 2018-03-13 NOTE — MR AVS SNAPSHOT
After Visit Summary   3/13/2018    Mila Oden    MRN: [id number]           Patient Information     Date Of Birth          1995        Visit Information        Provider Department      3/13/2018 11:00 AM Soraya Simon PA-C [facility]        Today's Diagnoses     Acute sinusitis with symptoms > 10 days    -  1      Care Instructions      Self-Care for Sinusitis     Drinking plenty of water can help sinuses drain.   Sinusitis can often be managed with self-care. Self-care can keep sinuses moist and make you feel more comfortable. Remember to follow your doctor's instructions closely. This can make a big difference in getting your sinus problem under control.  Drink fluids  Drinking extra fluids helps thin your mucus. This lets it drain from your sinuses more easily. Have a glass of water every hour or two. A humidifier helps in much the same way. Fluids can also offset the drying effects of certain medicines. If you use a humidifier, follow the product maker's instructions on how to use it. Clean it on a regular schedule.  Use saltwater rinses  Rinses help keep your sinuses and nose moist. Mix a teaspoon of salt in 8 ounces of fresh, warm water. Use a bulb syringe to gently squirt the water into your nose a few times a day. You can also buy ready-made saline nasal sprays.  Apply hot or cold packs  Applying heat to the area surrounding your sinuses may make you feel more comfortable. Use a hot water bottle or a hand towel dipped in hot water. Some people also find ice packs effective for relieving pain.  Medicines  Your doctor may prescribe medications to help treat your sinusitis. If you have an infection, antibiotics can help clear it up. If you are prescribed antibiotics, take all pills on schedule until they are gone, even if you feel better. Decongestants help relieve swelling. Use decongestant sprays for short periods only under the direction of your doctor. If you have  allergies, your doctor may prescribe medications to help relieve them.   Date Last Reviewed: 10/1/2016    0185-4927 [employer]. [address] [place] [address], [place] [address]. All rights reserved. This information is not intended as a substitute for professional medical care. Always follow your healthcare professional's instructions.                Follow-ups after your visit        Your next 10 appointments already scheduled     Apr 02, 2018  3:00 PM CDT   ESTABL[facility]ATAL [facility]    [address] [place] [address]   [phone]            Apr 02, 2018  4:40 PM CDT   (Arrive by 4:25 PM)   US OB > 14 WEEKS COMPLETE SINGLE with PHUS1   [facility] ([facility])    [address] [place] [address]   [phone]           Please bring a list of your medicines (including vitamins, minerals and over-the-counter drugs). Also, tell your doctor about any allergies you may have. Wear comfortable clothes and leave your valuables at home.  If you re less than 20 weeks drink four 8-ounce glasses of fluid an hour before your exam. If you need to empty your bladder before your exam, try to release only a little urine. Then, drink another glass of fluid.  You may have up to two family members in the exam room. If you bring a small child, an adult must be there to care for him or her.  Please call the Imaging Department at your exam site with any questions.            May 07, 2018  2:00 PM CDT   ESTABLISHED PRENATAL with Candice Rivero MD   [facility] ([address] [address]   [phone]            Jun 04, 2018  2:00 PM CDT   ESTABLISHED PRENATAL with Candice Rivero MD   [facility] ([address] [address]  "  [phone]            2018  2:00 PM CDT   ESTABLISHED PRENATAL with [facility] MD   [facility]    [address] [place] [phone]            2018  2:00 PM CDT   ESTABLISHED PRENATAL with Candice Rivero MD   [facility] ([address] [phone] [phone]              Who to contact     If you have questions or need follow up information about today's clinic visit or your schedule please contact [facility] directly at [phone].  Normal or non-critical lab and imaging results will be communicated to you by MyChart, letter or phone within 4 business days after the clinic has received the results. If you do not hear from us within 7 days, please contact the clinic through MyChart or phone. If you have a critical or abnormal lab result, we will notify you by phone as soon as possible.  Submit refill requests through Roobiq or call your pharmacy and they will forward the refill request to us. Please allow 3 business days for your refill to be completed.          Additional Information About Your Visit        Tip or SkipharTRELYS Information     Roobiq lets you send messages to your doctor, view your test results, renew your prescriptions, schedule appointments and more. To sign up, go to [email or web address] . Click on \"Log in\" on the left side of the screen, which will take you to the Welcome page. Then click on \"Sign up Now\" on the right side of the page.     You will be asked to enter the access code listed below, as well as some personal information. Please follow the directions to create your username and password.     Your access code is: [email or web address]  Expires: 2018  2:50 PM     Your access code will  in 90 days. If you need help or a new code, please call your [facility] clinic or [phone].      "   Care EveryWhere ID     This is your Care EveryWhere ID. This could be used by other organizations to access your [facility] medical records  [id number]        Your Vitals Were     Pulse Temperature Respirations Last Period BMI (Body Mass Index)       76 97.9  F (36.6  C) (Oral) 12 11/15/2017 (Approximate) 36.3 kg/m2        Blood Pressure from Last 3 Encounters:   03/13/18 112/60   03/09/18 102/50   02/07/18 108/54    Weight from Last 3 Encounters:   03/13/18 245 lb 12.8 oz (111.5 kg)   03/09/18 245 lb 12.8 oz (111.5 kg)   02/07/18 244 lb 9.6 oz (110.9 kg)              Today, you had the following     No orders found for display         Today's Medication Changes          These changes are accurate as of 3/13/18 11:03 AM.  If you have any questions, ask your nurse or doctor.               Start taking these medicines.        Dose/Directions    cephALEXin 500 MG capsule   Commonly known as:  KEFLEX   Used for:  Acute sinusitis with symptoms > 10 days   Started by:  Soraya Simon PA-C        Dose:  500 mg   Take 1 capsule (500 mg) by mouth 3 times daily   Quantity:  30 capsule   Refills:  0            Where to get your medicines      Some of these will need a paper prescription and others can be bought over the counter.  Ask your nurse if you have questions.     Bring a paper prescription for each of these medications     cephALEXin 500 MG capsule                Primary Care Provider Office Phone # Fax #    Candice Irasema Rivero [phone] [address] [place] [address]        Equal Access to Services     [facility]: Hadii jessika santos Somateo, waaxda luqadaha, qaybta kaalmada minna hayes idiin hayaan adeeg kharash la'aan . So [facility] [phone].    ATENCIÓN: Si habla español, tiene a perez disposición servicios gratuitos de asistencia lingüística. Llame al [phone].    We comply with applicable federal civil rights laws and [place] laws. We do not discriminate  on the basis of race, color, national origin, age, disability, sex, sexual orientation, or gender identity.            Thank you!     Thank you for choosing [facility]  for your care. Our goal is always to provide you with excellent care. Hearing back from our patients is one way we can continue to improve our services. Please take a few minutes to complete the written survey that you may receive in the mail after your visit with us. Thank you!             Your Updated Medication List - Protect others around you: Learn how to safely use, store and throw away your medicines at www.disposemymeds.org.          This list is accurate as of 3/13/18 11:03 AM.  Always use your most recent med list.                   Brand Name Dispense Instructions for use Diagnosis    cephALEXin 500 MG capsule    KEFLEX    30 capsule    Take 1 capsule (500 mg) by mouth 3 times daily    Acute sinusitis with symptoms > 10 days       prenatal multivitamin plus iron 27-0.8 MG Tabs per tablet      Take 1 tablet by mouth daily

## 2018-03-13 NOTE — PROGRESS NOTES
SUBJECTIVE:   Mila Oden is a 22 year old female who presents to clinic today for the following health issues:      Acute Illness   Acute illness concerns: right ear pain  Onset: 1 week    Fever: no    Chills/Sweats: no    Headache (location?): YES    Sinus Pressure:YES    Conjunctivitis:  no    Ear Pain: YES: right    Rhinorrhea: no    Congestion: YES    Sore Throat: no     Cough: YES-productive of clear sputum, productive of yellow sputum    Wheeze: no    Decreased Appetite: no    Nausea: no    Vomiting: no    Diarrhea:  no    Dysuria/Freq.: no    Fatigue/Achiness: YES    Sick/Strep Exposure: YES     Therapies Tried and outcome: nothing    Patient has had sinus pressure and pain as well as right ear pain for the last week. She has not had fevers or chills, she has had a cough and cold symptoms. She is currently pregnant.   -------------------------------------    Problem list and histories reviewed & adjusted, as indicated.  Additional history: as documented    BP Readings from Last 3 Encounters:   03/13/18 112/60   03/09/18 102/50   02/07/18 108/54    Wt Readings from Last 3 Encounters:   03/13/18 245 lb 12.8 oz (111.5 kg)   03/09/18 245 lb 12.8 oz (111.5 kg)   02/07/18 244 lb 9.6 oz (110.9 kg)         Reviewed and updated as needed this visit by clinical staff  Tobacco  Allergies  Med Hx  Surg Hx  Fam Hx  Soc Hx      Reviewed and updated as needed this visit by Provider         ROS:  Constitutional, HEENT, cardiovascular, pulmonary, gi and gu systems are negative, except as otherwise noted.    OBJECTIVE:     /60 (BP Location: Right arm, Patient Position: Chair, Cuff Size: Adult Large)  Pulse 76  Temp 97.9  F (36.6  C) (Oral)  Resp 12  Wt 245 lb 12.8 oz (111.5 kg)  LMP 11/15/2017 (Approximate)  BMI 36.3 kg/m2  Body mass index is 36.3 kg/(m^2).  GENERAL: alert and no distress  EYES: Eyes grossly normal to inspection  HENT: normal cephalic/atraumatic, right ear: clear effusion, left ear:  post surgical changes, erythema, rhinorrhea yellow, oropharynx clear, oral mucous membranes moist and sinuses: maxillary tenderness on right  NECK: no adenopathy  RESP: lungs clear to auscultation - no rales, rhonchi or wheezes  CV: regular rates and rhythm and no murmur, click or rub  MS: no gross musculoskeletal defects noted, no edema  SKIN: no suspicious lesions or rashes    Diagnostic Test Results:  none     ASSESSMENT/PLAN:       ICD-10-CM    1. Acute sinusitis with symptoms > 10 days J01.90 cephALEXin (KEFLEX) 500 MG capsule       I will have patient use heat packs, increased fluids and rest and if sinus symptoms are persisting or worsening go ahead and start antibiotic. Follow up in clinic as needed.   See Patient Instructions    Soraya Simon PA-C  [facility]

## 2018-03-13 NOTE — PATIENT INSTRUCTIONS
Self-Care for Sinusitis     Drinking plenty of water can help sinuses drain.   Sinusitis can often be managed with self-care. Self-care can keep sinuses moist and make you feel more comfortable. Remember to follow your doctor's instructions closely. This can make a big difference in getting your sinus problem under control.  Drink fluids  Drinking extra fluids helps thin your mucus. This lets it drain from your sinuses more easily. Have a glass of water every hour or two. A humidifier helps in much the same way. Fluids can also offset the drying effects of certain medicines. If you use a humidifier, follow the product maker's instructions on how to use it. Clean it on a regular schedule.  Use saltwater rinses  Rinses help keep your sinuses and nose moist. Mix a teaspoon of salt in 8 ounces of fresh, warm water. Use a bulb syringe to gently squirt the water into your nose a few times a day. You can also buy ready-made saline nasal sprays.  Apply hot or cold packs  Applying heat to the area surrounding your sinuses may make you feel more comfortable. Use a hot water bottle or a hand towel dipped in hot water. Some people also find ice packs effective for relieving pain.  Medicines  Your doctor may prescribe medications to help treat your sinusitis. If you have an infection, antibiotics can help clear it up. If you are prescribed antibiotics, take all pills on schedule until they are gone, even if you feel better. Decongestants help relieve swelling. Use decongestant sprays for short periods only under the direction of your doctor. If you have allergies, your doctor may prescribe medications to help relieve them.   Date Last Reviewed: 10/1/2016    8790-9167 [employer]. [address] [place] [address]38. All rights reserved. This information is not intended as a substitute for professional medical care. Always follow your healthcare professional's instructions.

## 2018-03-13 NOTE — NURSING NOTE
"Chief Complaint   Patient presents with     Ear Problem     right ear pain, x 1 week       Initial /60 (BP Location: Right arm, Patient Position: Chair, Cuff Size: Adult Large)  Pulse 76  Temp 97.9  F (36.6  C) (Oral)  Resp 12  Wt 245 lb 12.8 oz (111.5 kg)  LMP 11/15/2017 (Approximate)  BMI 36.3 kg/m2 Estimated body mass index is 36.3 kg/(m^2) as calculated from the following:    Height as of 1/29/18: 5' 9\" (1.753 m).    Weight as of this encounter: 245 lb 12.8 oz (111.5 kg).  Medication Reconciliation: complete     Roxanne DOMINGUEZ LPN      "

## 2018-04-02 ENCOUNTER — HOSPITAL ENCOUNTER (OUTPATIENT)
Dept: ULTRASOUND IMAGING | Facility: CLINIC | Age: 23
Discharge: HOME | End: 2018-04-02
Attending: FAMILY MEDICINE | Admitting: FAMILY MEDICINE
Payer: COMMERCIAL

## 2018-04-02 ENCOUNTER — PRENATAL OFFICE VISIT (OUTPATIENT)
Dept: FAMILY MEDICINE | Facility: CLINIC | Age: 23
End: 2018-04-02
Payer: COMMERCIAL

## 2018-04-02 VITALS
SYSTOLIC BLOOD PRESSURE: 118 MMHG | OXYGEN SATURATION: 97 % | DIASTOLIC BLOOD PRESSURE: 58 MMHG | WEIGHT: 248 LBS | TEMPERATURE: 98.2 F | BODY MASS INDEX: 36.62 KG/M2 | HEART RATE: 87 BPM

## 2018-04-02 DIAGNOSIS — Z34.02 ENCOUNTER FOR SUPERVISION OF NORMAL FIRST PREGNANCY IN SECOND TRIMESTER: ICD-10-CM

## 2018-04-02 DIAGNOSIS — Z34.02 ENCOUNTER FOR SUPERVISION OF NORMAL FIRST PREGNANCY IN SECOND TRIMESTER: Primary | ICD-10-CM

## 2018-04-02 PROCEDURE — 99207 ZZC PRENATAL VISIT: CPT | Performed by: FAMILY MEDICINE

## 2018-04-02 PROCEDURE — 76805 OB US >/= 14 WKS SNGL FETUS: CPT

## 2018-04-02 ASSESSMENT — PAIN SCALES - GENERAL: PAINLEVEL: NO PAIN (0)

## 2018-04-02 NOTE — PROGRESS NOTES
Doing well overall.  No bleeding, no regular ctx - She notes occasional quickening.   No headaches, dizziness, visual blurriness, nausea, emesis, or other associated symptoms.   The patient was seen in the clinic on 3/13 for sinusitis and treated with Keflex.   20 Week U/S done today (It's a girl!). EFW is at the 44th percentile. The U/S report is not in yet, will notify her of results when I review the radiologist's report.   1 Hour GTT, OB, HGB, Repeat RPR, and Cervix Check at next visit.   Patient is planning on doing prenatal classes.  Info given.   Discussed PTL, PROM, and when to call or come in.  Discussed kick counts and fetal movement.  Reportable signs and symptoms discussed.  Follow up in 4 weeks or sooner if needed.   Candice Rivero MD

## 2018-04-02 NOTE — MR AVS SNAPSHOT
After Visit Summary   4/2/2018    Mila Oden    MRN: [id number]           Patient Information     Date Of Birth          1995        Visit Information        Provider Department      4/2/2018 3:00 PM Candice Rivero MD [facility]        Today's Diagnoses     Encounter for supervision of normal first pregnancy in second trimester    -  1       Follow-ups after your visit        Your next 10 appointments already scheduled     May 07, 2018  2:00 PM CDT   ESTABLISHED PRENATAL with Candice Rivero MD   [facility] ([facility]    [address] [place] [address]   [phone]            Jun 04, 2018  2:00 PM CDT   ESTABLISHED PRENATAL with Candice Rivero MD   [facility] ([facility])    [address] [place] [address]   [phone]            Jun 18, 2018  2:00 PM CDT   ESTABLISHED PRENATAL with Candice Rivero MD   [facility] ([facility])    [address] [place] [address]   [phone]            Jul 02, 2018  2:00 PM CDT   ESTABLISHED PRENATAL with Candice Rivero MD   [facility] ([address] [address]   [phone]              Future tests that were ordered for you today     Open Future Orders        Priority Expected Expires Ordered    Glucose tolerance, gest screen, 1 hour Routine 4/30/2018 7/2/2018 4/2/2018    Anti Treponema Routine 4/30/2018 7/2/2018 4/2/2018    Hemoglobin Routine 4/30/2018 7/2/2018 4/2/2018            Who to contact     If you have questions or need follow up information about today's clinic visit or your schedule please contact [facility] directly at [phone].  Normal or non-critical lab and imaging results will be communicated to you by MyChart, letter or phone  "within 4 business days after the clinic has received the results. If you do not hear from us within 7 days, please contact the clinic through [employer] or phone. If you have a critical or abnormal lab result, we will notify you by phone as soon as possible.  Submit refill requests through O[employer] or call your pharmacy and they will forward the refill request to us. Please allow 3 business days for your refill to be completed.          Additional Information About Your Visit        Opiatalk Information     Opiatalk lets you send messages to your doctor, view your test results, renew your prescriptions, schedule appointments and more. To sign up, go to [email or web address] . Click on \"Log in\" on the left side of the screen, which will take you to the Welcome page. Then click on \"Sign up Now\" on the right side of the page.     You will be asked to enter the access code listed below, as well as some personal information. Please follow the directions to create your username and password.     Your access code is: [email or web address]  Expires: 2018  2:50 PM     Your access code will  in 90 days. If you need help or a new code, please call your [place] clinic or [phone].        Care EveryWhere ID     This is your Care EveryWhere ID. This could be used by other organizations to access your [place] medical records  [id number]        Your Vitals Were     Pulse Temperature Last Period Pulse Oximetry BMI (Body Mass Index)       87 98.2  F (36.8  C) (Tympanic) 11/15/2017 (Approximate) 97% 36.62 kg/m2        Blood Pressure from Last 3 Encounters:   18 118/58   18 112/60   18 102/50    Weight from Last 3 Encounters:   18 248 lb (112.5 kg)   18 245 lb 12.8 oz (111.5 kg)   18 245 lb 12.8 oz (111.5 kg)               Primary Care Provider Office Phone # Fax #    Candice Irasema Rivero [phone]       [address] [place] [address]        Equal Access to " Services     [facility]: Hadii jessika Landin, waanuragda luqadaha, qaybta kaalmabry hayes, minna garner. So [facility] [phone].    ATENCIÓN: Si habla español, tiene a perez disposición servicios gratuitos de asistencia lingüística. Llame al [phone].    We comply with applicable federal civil rights laws and [place] laws. We do not discriminate on the basis of race, color, national origin, age, disability, sex, sexual orientation, or gender identity.            Thank you!     Thank you for choosing [facility]  for your care. Our goal is always to provide you with excellent care. Hearing back from our patients is one way we can continue to improve our services. Please take a few minutes to complete the written survey that you may receive in the mail after your visit with us. Thank you!             Your Updated Medication List - Protect others around you: Learn how to safely use, store and throw away your medicines at www.disposemymeds.org.          This list is accurate as of 4/2/18  7:16 PM.  Always use your most recent med list.                   Brand Name Dispense Instructions for use Diagnosis    cephALEXin 500 MG capsule    KEFLEX    30 capsule    Take 1 capsule (500 mg) by mouth 3 times daily    Acute sinusitis with symptoms > 10 days       prenatal multivitamin plus iron 27-0.8 MG Tabs per tablet      Take 1 tablet by mouth daily

## 2018-04-02 NOTE — NURSING NOTE
"Chief Complaint   Patient presents with     Prenatal Care     21w3d    Initial /58  Pulse 87  Temp 98.2  F (36.8  C) (Tympanic)  Wt 248 lb (112.5 kg)  LMP 11/15/2017 (Approximate)  SpO2 97%  BMI 36.62 kg/m2 Estimated body mass index is 36.62 kg/(m^2) as calculated from the following:    Height as of 1/29/18: 5' 9\" (1.753 m).    Weight as of this encounter: 248 lb (112.5 kg).  Medication Reconciliation: complete  "

## 2018-04-04 ENCOUNTER — TELEPHONE (OUTPATIENT)
Dept: FAMILY MEDICINE | Facility: CLINIC | Age: 23
End: 2018-04-04

## 2018-04-04 DIAGNOSIS — Z34.93 ENCOUNTER FOR PREGNANCY RELATED EXAMINATION IN THIRD TRIMESTER: Primary | ICD-10-CM

## 2018-04-04 NOTE — TELEPHONE ENCOUNTER
----- Message from Candice Rivero MD sent at 4/3/2018  7:20 PM CDT -----  Notify Mila that everything on her ultrasound was normal except we couldn't see the baby's face/profile well. I'd like to recheck with another u/s in about 1-2 weeks. Please arrange.   Candice Rivero MD

## 2018-04-04 NOTE — TELEPHONE ENCOUNTER
Left message for patient to return call to clinic.  Please inform patient of below message and assist in scheduling radiology appt. Future order placed for repeat US.   Alicia Barr, CMA

## 2018-04-04 NOTE — PROGRESS NOTES
Notify Mila that everything on her ultrasound was normal except we couldn't see the baby's face/profile well. I'd like to recheck with another u/s in about 1-2 weeks. Please arrange.   Candice Rivero MD

## 2018-04-04 NOTE — TELEPHONE ENCOUNTER
Patient called back and info was given.  Thank you,  Mabel Malcolm   for [facility]

## 2018-04-13 ENCOUNTER — HOSPITAL ENCOUNTER (OUTPATIENT)
Dept: ULTRASOUND IMAGING | Facility: CLINIC | Age: 23
Discharge: HOME | End: 2018-04-13
Attending: FAMILY MEDICINE | Admitting: FAMILY MEDICINE
Payer: COMMERCIAL

## 2018-04-13 DIAGNOSIS — Z34.93 ENCOUNTER FOR PREGNANCY RELATED EXAMINATION IN THIRD TRIMESTER: ICD-10-CM

## 2018-04-13 PROCEDURE — 76816 OB US FOLLOW-UP PER FETUS: CPT

## 2018-05-01 ENCOUNTER — TELEPHONE (OUTPATIENT)
Dept: FAMILY MEDICINE | Facility: CLINIC | Age: 23
End: 2018-05-01

## 2018-05-01 NOTE — PROGRESS NOTES
Notify Mila that her ultrasound looks ok. The baby still was not cooperative enough to get a perfect view but everything looks normal.  Candice Rivero MD

## 2018-05-01 NOTE — TELEPHONE ENCOUNTER
----- Message from Candice Rivero MD sent at 5/1/2018 10:57 AM CDT -----  Notify Mila that her ultrasound looks ok. The baby still was not cooperative enough to get a perfect view but everything looks normal.  Candice Rivero MD

## 2018-05-07 ENCOUNTER — PRENATAL OFFICE VISIT (OUTPATIENT)
Dept: FAMILY MEDICINE | Facility: CLINIC | Age: 23
End: 2018-05-07
Payer: COMMERCIAL

## 2018-05-07 ENCOUNTER — APPOINTMENT (OUTPATIENT)
Dept: LAB | Facility: CLINIC | Age: 23
End: 2018-05-07
Payer: COMMERCIAL

## 2018-05-07 VITALS
RESPIRATION RATE: 16 BRPM | TEMPERATURE: 98 F | HEART RATE: 105 BPM | WEIGHT: 251.8 LBS | BODY MASS INDEX: 37.18 KG/M2 | DIASTOLIC BLOOD PRESSURE: 56 MMHG | SYSTOLIC BLOOD PRESSURE: 106 MMHG | OXYGEN SATURATION: 98 %

## 2018-05-07 DIAGNOSIS — Z34.02 ENCOUNTER FOR SUPERVISION OF NORMAL FIRST PREGNANCY IN SECOND TRIMESTER: ICD-10-CM

## 2018-05-07 LAB
GLUCOSE 1H P 50 G GLC PO SERPL-MCNC: 71 MG/DL (ref 60–129)
HGB BLD-MCNC: 13.5 G/DL (ref 11.7–15.7)

## 2018-05-07 PROCEDURE — 86780 TREPONEMA PALLIDUM: CPT | Performed by: FAMILY MEDICINE

## 2018-05-07 PROCEDURE — 99207 ZZC PRENATAL VISIT: CPT | Performed by: FAMILY MEDICINE

## 2018-05-07 PROCEDURE — 85018 HEMOGLOBIN: CPT | Performed by: FAMILY MEDICINE

## 2018-05-07 PROCEDURE — 36415 COLL VENOUS BLD VENIPUNCTURE: CPT | Performed by: FAMILY MEDICINE

## 2018-05-07 PROCEDURE — 82950 GLUCOSE TEST: CPT | Performed by: FAMILY MEDICINE

## 2018-05-07 NOTE — PROGRESS NOTES
Doing well overall.  No bleeding, no ctx  No  dizziness, visual blurriness, nausea, emesis, or other associated symptoms. Has had some headaches but she believes its a sinus thing, she has been really congested recently.  1 hour GTT, Hgb, and RPR pending, will notify with results.   Dicussed fetal kick counts.  Tdap at next visit.  Discussed  labor.   Discussed and recommend prenatal classes, discussed birth plans if desired.    Follow up in 4 weeks or sooner if needed.   Candice Rivero MD

## 2018-05-07 NOTE — MR AVS SNAPSHOT
After Visit Summary   5/7/2018    Mila Oden    MRN: [id number]           Patient Information     Date Of Birth          1995        Visit Information        Provider Department      5/7/2018 2:00 PM Candice Rivero MD [facility]        Today's Diagnoses     Encounter for supervision of normal first pregnancy in second trimester           Follow-ups after your visit        Your next 10 appointments already scheduled     Jun 04, 2018  2:00 PM CDT   ESTABLISHED PRENATAL with Candice Rivero MD   [facility]    [address] [place] [address]   [phone]            Jun 18, 2018  2:00 PM CDT   ESTABLISHED PRENATAL with Candice Rivero MD   [facility]    [address] [place] [address]   [phone]            Jul 02, 2018  2:00 PM CDT   ESTABLISHED PRENATAL with Candice Rivero MD   [facility]    [address] [place] [address]   [phone]            Jul 16, 2018  1:00 PM CDT   ESTABLISHED PRENATAL with Candice Rivero MD   [facility]    [address] [place] [address]   [phone]            Jul 23, 2018  1:00 PM CDT   ESTABLISHED PRENATAL with Candice Rivero MD   [facility]    [address] [place] [address]   [phone]            Jul 30, 2018  1:00 PM CDT   ESTABLISHED PRENATAL with Candice Rivero MD   [facility]    [address] [place] [address]   [phone]            Aug 06, 2018  1:00 PM CDT   ESTABLISHED PRENATAL with Candice Rivero MD   [facility] ([address]  "[facility] [id number] [phone]            Aug 13, 2018  1:00 PM CDT   ESTABLISHED PRENATAL with Candice Rivero MD   [facility]    [address] [facility] [address] [phone]              Who to contact     If you have questions or need follow up information about today's clinic visit or your schedule please contact [facility] directly at [phone].  Normal or non-critical lab and imaging results will be communicated to you by FormaFinahart, letter or phone within 4 business days after the clinic has received the results. If you do not hear from us within 7 days, please contact the clinic through "[employer]t or phone. If you have a critical or abnormal lab result, we will notify you by phone as soon as possible.  Submit refill requests through [employer] or call your pharmacy and they will forward the refill request to us. Please allow 3 business days for your refill to be completed.          Additional Information About Your Visit        FormaFinahar"[employer] Information     [employer] lets you send messages to your doctor, view your test results, renew your prescriptions, schedule appointments and more. To sign up, go to [email or web address] . Click on \"Log in\" on the left side of the screen, which will take you to the Welcome page. Then click on \"Sign up Now\" on the right side of the page.     You will be asked to enter the access code listed below, as well as some personal information. Please follow the directions to create your username and password.     Your access code is: [email or web address]  Expires: 2018  1:05 AM     Your access code will  in 90 days. If you need help or a new code, please call your [facility] or [phone].        Care EveryWhere ID     This is your Care EveryWhere ID. This could be used by other organizations to access your [place] medical records  [id number]        Your Vitals Were     Pulse " Temperature Respirations Last Period Pulse Oximetry BMI (Body Mass Index)    105 98  F (36.7  C) (Temporal) 16 11/15/2017 (Approximate) 98% 37.18 kg/m2       Blood Pressure from Last 3 Encounters:   05/07/18 106/56   04/02/18 118/58   03/13/18 112/60    Weight from Last 3 Encounters:   05/07/18 251 lb 12.8 oz (114.2 kg)   04/02/18 248 lb (112.5 kg)   03/13/18 245 lb 12.8 oz (111.5 kg)              We Performed the Following     Anti Treponema     Glucose tolerance, gest screen, 1 hour     Hemoglobin        Primary Care Provider Office Phone # Fax #    Candice Rivero [phone] [address] [place] [address]        Equal Access to Services     [facility] : Hadii aad ku hadasho Somateo, waaxda luqadaha, qaybta kaalmada freddy, minna morillo . So [facility] [phone].    ATENCIÓN: Si habla español, tiene a perez disposición servicios gratuitos de asistencia lingüística. Thania al [phone].    We comply with applicable federal civil rights laws and [place] laws. We do not discriminate on the basis of race, color, national origin, age, disability, sex, sexual orientation, or gender identity.            Thank you!     Thank you for choosing [facility]  for your care. Our goal is always to provide you with excellent care. Hearing back from our patients is one way we can continue to improve our services. Please take a few minutes to complete the written survey that you may receive in the mail after your visit with us. Thank you!             Your Updated Medication List - Protect others around you: Learn how to safely use, store and throw away your medicines at www.disposemymeds.org.          This list is accurate as of 5/7/18 11:59 PM.  Always use your most recent med list.                   Brand Name Dispense Instructions for use Diagnosis    prenatal multivitamin plus iron 27-0.8 MG Tabs per tablet      Take 1 tablet by mouth  daily

## 2018-05-08 LAB — T PALLIDUM IGG+IGM SER QL: NEGATIVE

## 2018-06-04 ENCOUNTER — PRENATAL OFFICE VISIT (OUTPATIENT)
Dept: FAMILY MEDICINE | Facility: CLINIC | Age: 23
End: 2018-06-04
Payer: COMMERCIAL

## 2018-06-04 VITALS
OXYGEN SATURATION: 96 % | WEIGHT: 254.6 LBS | HEART RATE: 99 BPM | BODY MASS INDEX: 37.6 KG/M2 | TEMPERATURE: 98.3 F | DIASTOLIC BLOOD PRESSURE: 54 MMHG | SYSTOLIC BLOOD PRESSURE: 112 MMHG

## 2018-06-04 DIAGNOSIS — Z23 ENCOUNTER FOR IMMUNIZATION: ICD-10-CM

## 2018-06-04 DIAGNOSIS — Z34.03 ENCOUNTER FOR SUPERVISION OF NORMAL FIRST PREGNANCY IN THIRD TRIMESTER: Primary | ICD-10-CM

## 2018-06-04 PROBLEM — Z34.01 ENCOUNTER FOR SUPERVISION OF NORMAL FIRST PREGNANCY IN FIRST TRIMESTER: Status: RESOLVED | Noted: 2018-02-05 | Resolved: 2018-06-04

## 2018-06-04 PROCEDURE — 90471 IMMUNIZATION ADMIN: CPT | Performed by: FAMILY MEDICINE

## 2018-06-04 PROCEDURE — 90715 TDAP VACCINE 7 YRS/> IM: CPT | Performed by: FAMILY MEDICINE

## 2018-06-04 PROCEDURE — 99207 ZZC PRENATAL VISIT: CPT | Performed by: FAMILY MEDICINE

## 2018-06-04 ASSESSMENT — PAIN SCALES - GENERAL: PAINLEVEL: NO PAIN (0)

## 2018-06-04 NOTE — PROGRESS NOTES
Doing well overall.  No bleeding. Saturday was having constipation and was having some left lower abdomial pain from this. Didn't know if she should be having that much pain with it.  It is better now, bowels moving.  Discussed fiber supplements and stool softener options for constipation. Discussed concerning types of pain, if it recurs, to have evaluated.    No headaches, dizziness, visual blurriness, nausea, emesis, or other associated symptoms.   Discussed  labor.  Will be taking prenatal classes next month.   Tdap given today   Discussed warning signs for preeclampsia.  Will f/u in 2 week(s) or sooner with any concern for decreased fetal movement, vaginal bleeding, fluid leak, headache, vision change, severe nausea or vomiting, abdominal pain, increased edema or other concerns.   Candice Rivero MD

## 2018-06-04 NOTE — MR AVS SNAPSHOT
After Visit Summary   6/4/2018    Mila Oden    MRN: [id number]           Patient Information     Date Of Birth          1995        Visit Information        Provider Department      6/4/2018 2:00 PM Candice Rivero MD [facility]        Today's Diagnoses     Encounter for supervision of normal first pregnancy in third trimester    -  1    Encounter for immunization           Follow-ups after your visit        Your next 10 appointments already scheduled     Jun 18, 2018  2:00 PM CDT   ESTABLISHED PRENATAL with Candice Rivero MD   [facility] ([facility]    [address] [place] [address]   [phone]            Jul 02, 2018  2:00 PM CDT   ESTABLISHED PRENATAL with Candice Rivero MD   [facility] ([facility])    [address] [place] [address]   [phone]            Jul 16, 2018  1:00 PM CDT   ESTABLISHED PRENATAL with Candice Rivero MD   [facility] ([facility])    [address] [place] [address]   [phone]            Jul 23, 2018  1:00 PM CDT   ESTABLISHED PRENATAL with Candice Rivero MD   [facility] ([facility])    [address] [place] [address]   [phone]            Jul 30, 2018  1:00 PM CDT   ESTABLISHED PRENATAL with Candice Rivero MD   [facility] ([facility])    [address] [place] [address]   [phone]            Aug 06, 2018  1:00 PM CDT   ESTABLISHED PRENATAL with Candice Rivero MD   [facility] ([facility])    [address] [place] [address]   [phone]            Aug 13, 2018  1:00 PM CDT   ESTABLISHED PRENATAL with Candice Rivero MD   [facility] ([place]  "[place] [id number] [facility] [id number] [phone]              Who to contact     If you have questions or need follow up information about today's clinic visit or your schedule please contact [facility] directly at [phone].  Normal or non-critical lab and imaging results will be communicated to you by MyChart, letter or phone within 4 business days after the clinic has received the results. If you do not hear from us within 7 days, please contact the clinic through MyChart or phone. If you have a critical or abnormal lab result, we will notify you by phone as soon as possible.  Submit refill requests through [employer] or call your pharmacy and they will forward the refill request to us. Please allow 3 business days for your refill to be completed.          Additional Information About Your Visit        [facility] Information     [employer] lets you send messages to your doctor, view your test results, renew your prescriptions, schedule appointments and more. To sign up, go to [email or web address] . Click on \"Log in\" on the left side of the screen, which will take you to the Welcome page. Then click on \"Sign up Now\" on the right side of the page.     You will be asked to enter the access code listed below, as well as some personal information. Please follow the directions to create your username and password.     Your access code is: [email or web address]  Expires: 2018  1:05 AM     Your access code will  in 90 days. If you need help or a new code, please call your [place] clinic or [phone].        Care EveryWhere ID     This is your Care EveryWhere ID. This could be used by other organizations to access your [place] medical records  [id number]        Your Vitals Were     Pulse Temperature Last Period Pulse Oximetry BMI (Body Mass Index)       99 98.3  F (36.8  C) (Temporal) 11/15/2017 (Approximate) 96% 37.6 kg/m2        Blood Pressure from Last 3 Encounters: "   06/04/18 112/54   05/07/18 106/56   04/02/18 118/58    Weight from Last 3 Encounters:   06/04/18 254 lb 9.6 oz (115.5 kg)   05/07/18 251 lb 12.8 oz (114.2 kg)   04/02/18 248 lb (112.5 kg)              We Performed the Following     ADMIN 1st VACCINE     TDAP VACCINE (ADACEL)        Primary Care Provider Office Phone # Fax #    Candice Irasema Rivero -[phone]       [address] [place] [address]        Equal Access to Services     [facility]: Hadii jessika mix hadashnoemy Somateo, waaxda luqadaha, qaybta kaalmada freddy, minna morillo . So [facility] [phone].    ATENCIÓN: Si habla español, tiene a perez disposición servicios gratuitos de asistencia lingüística. LlPrem[facility] [phone].    We comply with applicable federal civil rights laws and [place] laws. We do not discriminate on the basis of race, color, national origin, age, disability, sex, sexual orientation, or gender identity.            Thank you!     Thank you for choosing [facility]  for your care. Our goal is always to provide you with excellent care. Hearing back from our patients is one way we can continue to improve our services. Please take a few minutes to complete the written survey that you may receive in the mail after your visit with us. Thank you!             Your Updated Medication List - Protect others around you: Learn how to safely use, store and throw away your medicines at www.disposemymeds.org.          This list is accurate as of 6/4/18  2:55 PM.  Always use your most recent med list.                   Brand Name Dispense Instructions for use Diagnosis    prenatal multivitamin plus iron 27-0.8 MG Tabs per tablet      Take 1 tablet by mouth daily

## 2018-06-18 ENCOUNTER — PRENATAL OFFICE VISIT (OUTPATIENT)
Dept: FAMILY MEDICINE | Facility: CLINIC | Age: 23
End: 2018-06-18
Payer: COMMERCIAL

## 2018-06-18 VITALS
HEART RATE: 94 BPM | DIASTOLIC BLOOD PRESSURE: 56 MMHG | WEIGHT: 256.6 LBS | BODY MASS INDEX: 37.89 KG/M2 | OXYGEN SATURATION: 97 % | SYSTOLIC BLOOD PRESSURE: 114 MMHG | TEMPERATURE: 97.5 F

## 2018-06-18 DIAGNOSIS — Z34.03 ENCOUNTER FOR SUPERVISION OF NORMAL FIRST PREGNANCY IN THIRD TRIMESTER: Primary | ICD-10-CM

## 2018-06-18 PROCEDURE — 99207 ZZC PRENATAL VISIT: CPT | Performed by: FAMILY MEDICINE

## 2018-06-18 ASSESSMENT — PAIN SCALES - GENERAL: PAINLEVEL: NO PAIN (0)

## 2018-06-18 NOTE — MR AVS SNAPSHOT
After Visit Summary   6/18/2018    Mila Oden    MRN: [id number]           Patient Information     Date Of Birth          1995        Visit Information        Provider Department      6/18/2018 2:00 PM Candice Rivero MD [facility]         Follow-ups after your visit        Your next 10 appointments already scheduled     Jul 02, 2018  2:00 PM CDT   ESTABLISHED PRENATAL with Candice Rivero MD   [facility] ([address] [address]   [phone]            Jul 16, 2018  1:00 PM CDT   ESTABLISHED PRENATAL with Candice Rivero MD   [facility] ([facility])    [address] [place] [address]   [phone]            Jul 23, 2018  1:00 PM CDT   ESTABLISHED PRENATAL with Candice Rivero MD   [facility] ([facility])    [address] [place] [address]   [phone]            Jul 30, 2018  1:00 PM CDT   ESTABLISHED PRENATAL with Candice Rivero MD   [facility] ([facility])    [address] [place] [address]   [phone]            Aug 06, 2018  1:00 PM CDT   ESTABLISHED PRENATAL with Candice Rivero MD   [facility] ([facility])    [address] [place] [address]   [phone]            Aug 13, 2018  1:00 PM CDT   ESTABLISHED PRENATAL with Candice Rivero MD   [facility] ([facility])    [address] [place] [address]   [phone]              Who to contact     If you have questions or need follow up information about today's clinic visit or your schedule please contact [facility] directly at [phone].  Normal or non-critical lab and imaging results will be communicated to you by  "MyChart, letter or phone within 4 business days after the clinic has received the results. If you do not hear from us within 7 days, please contact the clinic through [employer] or phone. If you have a critical or abnormal lab result, we will notify you by phone as soon as possible.  Submit refill requests through [employer] or call your pharmacy and they will forward the refill request to us. Please allow 3 business days for your refill to be completed.          Additional Information About Your Visit        [employer] Information     [employer] lets you send messages to your doctor, view your test results, renew your prescriptions, schedule appointments and more. To sign up, go to [email or web address] . Click on \"Log in\" on the left side of the screen, which will take you to the Welcome page. Then click on \"Sign up Now\" on the right side of the page.     You will be asked to enter the access code listed below, as well as some personal information. Please follow the directions to create your username and password.     Your access code is: [email or web address]  Expires: 2018  1:05 AM     Your access code will  in 90 days. If you need help or a new code, please call your [facility] clinic or [phone].        Care EveryWhere ID     This is your Care EveryWhere ID. This could be used by other organizations to access your [facility] medical records  [id number]        Your Vitals Were     Pulse Temperature Last Period Pulse Oximetry BMI (Body Mass Index)       94 97.5  F (36.4  C) (Temporal) 11/15/2017 (Approximate) 97% 37.89 kg/m2        Blood Pressure from Last 3 Encounters:   18 114/56   18 112/54   18 106/56    Weight from Last 3 Encounters:   18 256 lb 9.6 oz (116.4 kg)   18 254 lb 9.6 oz (115.5 kg)   18 251 lb 12.8 oz (114.2 kg)              Today, you had the following     No orders found for display       Primary Care Provider Office Phone # Fax #    Candice Rivero MD " [phone]       [address] [place] [address]        Equal Access to Services     SALLY WHIPPLE : Hadii aad ku hadbrendonnoemy Somireyaali, waanuragda luqadaha, qaybta kaalmada shavonneshayy, minna rubioin hayaajimmy marvinrosalio busterfrankmar garner. So [facility] [phone].    ATENCIÓN: Si habla español, tiene a perez disposición servicios gratuitos de asistencia lingüística. Llame al [phone].    We comply with applicable federal civil rights laws and [place] laws. We do not discriminate on the basis of race, color, national origin, age, disability, sex, sexual orientation, or gender identity.            Thank you!     Thank you for choosing [facility]  for your care. Our goal is always to provide you with excellent care. Hearing back from our patients is one way we can continue to improve our services. Please take a few minutes to complete the written survey that you may receive in the mail after your visit with us. Thank you!             Your Updated Medication List - Protect others around you: Learn how to safely use, store and throw away your medicines at www.disposemymeds.org.          This list is accurate as of 6/18/18  2:19 PM.  Always use your most recent med list.                   Brand Name Dispense Instructions for use Diagnosis    prenatal multivitamin plus iron 27-0.8 MG Tabs per tablet      Take 1 tablet by mouth daily

## 2018-06-18 NOTE — PROGRESS NOTES
Doing well overall.  No bleeding, no regular ctx.   Started prenatal classes.    Bowels are better.    Discussed  labor.   Discussed birth plans, will discuss more topics once they are further into their classes.  No questions yet.   Discussed warning signs for preeclampsia.  Will f/u in 2 week(s) or sooner with any concern for decreased fetal movement, vaginal bleeding, fluid leak, headache, vision change, severe nausea or vomiting, abdominal pain, increased edema or other concerns.   Candice Rivero MD

## 2018-07-02 ENCOUNTER — PRENATAL OFFICE VISIT (OUTPATIENT)
Dept: FAMILY MEDICINE | Facility: CLINIC | Age: 23
End: 2018-07-02
Payer: COMMERCIAL

## 2018-07-02 VITALS
WEIGHT: 257.8 LBS | OXYGEN SATURATION: 95 % | TEMPERATURE: 96.6 F | BODY MASS INDEX: 38.07 KG/M2 | SYSTOLIC BLOOD PRESSURE: 118 MMHG | HEART RATE: 96 BPM | DIASTOLIC BLOOD PRESSURE: 48 MMHG

## 2018-07-02 DIAGNOSIS — Z34.03 ENCOUNTER FOR SUPERVISION OF NORMAL FIRST PREGNANCY IN THIRD TRIMESTER: Primary | ICD-10-CM

## 2018-07-02 PROCEDURE — 99207 ZZC PRENATAL VISIT: CPT | Performed by: FAMILY MEDICINE

## 2018-07-02 RX ORDER — BREAST PUMP
EACH MISCELLANEOUS
Qty: 1 EACH | Refills: 0 | Status: SHIPPED | OUTPATIENT
Start: 2018-07-02 | End: 2020-01-02

## 2018-07-02 ASSESSMENT — PAIN SCALES - GENERAL: PAINLEVEL: NO PAIN (0)

## 2018-07-02 NOTE — PROGRESS NOTES
Doing well overall.  No bleeding, no regular ctx.   Discussed uncertain fetal lie, will check next time and do cervix check with GBS.   If not cephalic, will discuss further planning, briefly discussed ECV vs csection.   Breast pump Rx given.   Discussed  labor..   Discussed warning signs for preeclampsia.  Will f/u in 2 week(s) or sooner with any concern for decreased fetal movement, vaginal bleeding, fluid leak, headache, vision change, severe nausea or vomiting, abdominal pain, increased edema or other concerns.   Candice Rivero MD

## 2018-07-02 NOTE — MR AVS SNAPSHOT
After Visit Summary   7/2/2018    Mila Oden    MRN: [id number]           Patient Information     Date Of Birth          1995        Visit Information        Provider Department      7/2/2018 2:00 PM Candice Rivero MD [facility]        Today's Diagnoses     Encounter for supervision of normal first pregnancy in third trimester    -  1    Postpartum care and examination of lactating mother           Follow-ups after your visit        Your next 10 appointments already scheduled     Jul 16, 2018  1:00 PM CDT   ESTABLISHED PRENATAL with Candice Rivero MD   [facility] ([facility])    [address] [place] [address]   [phone]            Jul 23, 2018  1:00 PM CDT   ESTABLISHED PRENATAL with Candice Rivero MD   [facility] ([address] [address]   [phone]            Jul 30, 2018  1:00 PM CDT   ESTABLISHED PRENATAL with Candice Rivero MD   [facility] ([facility])    [address] [place] [address] [phone]            Aug 06, 2018  1:00 PM CDT   ESTABLISHED PRENATAL with Candice Rivero MD   [facility] ([facility])    [address] [place] [address] [phone]            Aug 13, 2018  1:00 PM CDT   ESTABLISHED PRENATAL with Candice Rivero MD   [facility] ([address] [address]   [phone]              Who to contact     If you have questions or need follow up information about today's clinic visit or your schedule please contact [facility] directly at [phone].  Normal or non-critical lab and imaging results will be communicated to you by MyChart, letter or phone within 4 business days after  "the clinic has received the results. If you do not hear from us within 7 days, please contact the clinic through [employer] or phone. If you have a critical or abnormal lab result, we will notify you by phone as soon as possible.  Submit refill requests through [employer] or call your pharmacy and they will forward the refill request to us. Please allow 3 business days for your refill to be completed.          Additional Information About Your Visit        [employer] Information     [employer] lets you send messages to your doctor, view your test results, renew your prescriptions, schedule appointments and more. To sign up, go to [email or web address] . Click on \"Log in\" on the left side of the screen, which will take you to the Welcome page. Then click on \"Sign up Now\" on the right side of the page.     You will be asked to enter the access code listed below, as well as some personal information. Please follow the directions to create your username and password.     Your access code is: [email or web address]  Expires: 2018  1:05 AM     Your access code will  in 90 days. If you need help or a new code, please call your [place] clinic or [phone].        Care EveryWhere ID     This is your Care EveryWhere ID. This could be used by other organizations to access your [place] medical records  [id number]        Your Vitals Were     Pulse Temperature Last Period Pulse Oximetry BMI (Body Mass Index)       96 96.6  F (35.9  C) (Temporal) 11/15/2017 (Approximate) 95% 38.07 kg/m2        Blood Pressure from Last 3 Encounters:   18 118/48   18 114/56   18 112/54    Weight from Last 3 Encounters:   18 257 lb 12.8 oz (116.9 kg)   18 256 lb 9.6 oz (116.4 kg)   18 254 lb 9.6 oz (115.5 kg)              Today, you had the following     No orders found for display         Today's Medication Changes          These changes are accurate as of 18  2:34 PM.  If you have any questions, ask your nurse " or doctor.               Start taking these medicines.        Dose/Directions    breast pump Misc   Used for:  Postpartum care and examination of lactating mother   Started by:  Candice Rivero MD        Use every 3 hours as indicated   Quantity:  1 each   Refills:  0            Where to get your medicines      These medications were sent to [facility] [place], [place] - [address]  [address] , [facility] [address]     Phone:  [phone]     breast pump Misc                Primary Care Provider Office Phone # Fax #    Candice Rivero -[phone] [address] [place] [address]        Equal Access to Services     [facility] : Hadii aad ku hadasho Soomaali, waaxda luqadaha, qaybta kaalmada adeegyada, waxay rubioin reg morillo . So [facility] [phone].    ATENCIÓN: Si habla español, tiene a perez disposición servicios gratuitos de asistencia lingüística. Llame al [phone].    We comply with applicable federal civil rights laws and [place] laws. We do not discriminate on the basis of race, color, national origin, age, disability, sex, sexual orientation, or gender identity.            Thank you!     Thank you for choosing [facility]  for your care. Our goal is always to provide you with excellent care. Hearing back from our patients is one way we can continue to improve our services. Please take a few minutes to complete the written survey that you may receive in the mail after your visit with us. Thank you!             Your Updated Medication List - Protect others around you: Learn how to safely use, store and throw away your medicines at www.disposemymeds.org.          This list is accurate as of 7/2/18  2:34 PM.  Always use your most recent med list.                   Brand Name Dispense Instructions for use Diagnosis    breast pump Misc     1 each    Use every 3 hours as indicated    Postpartum care and  examination of lactating mother       prenatal multivitamin plus iron 27-0.8 MG Tabs per tablet      Take 1 tablet by mouth daily

## 2018-07-16 ENCOUNTER — PRENATAL OFFICE VISIT (OUTPATIENT)
Dept: FAMILY MEDICINE | Facility: CLINIC | Age: 23
End: 2018-07-16
Payer: COMMERCIAL

## 2018-07-16 VITALS
HEART RATE: 112 BPM | SYSTOLIC BLOOD PRESSURE: 122 MMHG | WEIGHT: 260.4 LBS | BODY MASS INDEX: 38.45 KG/M2 | TEMPERATURE: 97.5 F | DIASTOLIC BLOOD PRESSURE: 44 MMHG | OXYGEN SATURATION: 96 %

## 2018-07-16 DIAGNOSIS — N89.8 VAGINAL DISCHARGE: ICD-10-CM

## 2018-07-16 DIAGNOSIS — B37.31 YEAST INFECTION OF THE VAGINA: ICD-10-CM

## 2018-07-16 DIAGNOSIS — Z34.03 ENCOUNTER FOR SUPERVISION OF NORMAL FIRST PREGNANCY IN THIRD TRIMESTER: Primary | ICD-10-CM

## 2018-07-16 LAB
SPECIMEN SOURCE: ABNORMAL
WET PREP SPEC: ABNORMAL

## 2018-07-16 PROCEDURE — 87210 SMEAR WET MOUNT SALINE/INK: CPT | Performed by: FAMILY MEDICINE

## 2018-07-16 PROCEDURE — 99207 ZZC PRENATAL VISIT: CPT | Performed by: FAMILY MEDICINE

## 2018-07-16 PROCEDURE — 87653 STREP B DNA AMP PROBE: CPT | Performed by: FAMILY MEDICINE

## 2018-07-16 ASSESSMENT — PAIN SCALES - GENERAL: PAINLEVEL: NO PAIN (0)

## 2018-07-16 NOTE — PROGRESS NOTES
Doing well overall.  No bleeding, No regular ctx.  She has noticed a few intermittent episodes of small white discharge in her underwear.  She states this occurs with minimal vaginal burning. She denies any pruritis or foul odor associated. She states these episodes resolve on their own within a few hours.  The last episode was last Friday. Wet prep was collected. Will contact with results.  Fetal lie is cephalic today.   Group B Strep screen collected today. Will contact with results.  Discussed when to present for signs and symptoms of labor.   Will follow up in 1 week or sooner with any concern for decreased fetal movement, vaginal bleeding, fluid leak, headache, cision change, severe nausea or vomiting, abdominal pain, increased edema or other concerns.   Carmenza CRAWFORD  Pt was personally seen, interviewed and examined by me, chart notes edited and I agree with assessment as documented above.  Candice Rivero MD

## 2018-07-16 NOTE — MR AVS SNAPSHOT
After Visit Summary   7/16/2018    Mila Oden    MRN: [id number]           Patient Information     Date Of Birth          1995        Visit Information        Provider Department      7/16/2018 1:00 PM Candice Rivero MD [facility]        Today's Diagnoses     Encounter for supervision of normal first pregnancy in third trimester    -  1       Follow-ups after your visit        Your next 10 appointments already scheduled     Jul 23, 2018  1:00 PM CDT   ESTABLISHED PRENATAL with Candice Rivero MD   [facility] ([address] [address]   [phone]            Jul 30, 2018  1:00 PM CDT   ESTABLISHED PRENATAL with Candice Rivero MD   [facility] ([address] [address]   [phone]            Aug 06, 2018  1:00 PM CDT   ESTABLISHED PRENATAL with Candice Rivero MD   [facility] ([facility])    [address] [place] [address]   [phone]            Aug 13, 2018  1:00 PM CDT   ESTABLISHED PRENATAL with Candice Rivero MD   [facility] ([address] [address] [phone]              Who to contact     If you have questions or need follow up information about today's clinic visit or your schedule please contact [facility] directly at [phone].  Normal or non-critical lab and imaging results will be communicated to you by MyChart, letter or phone within 4 business days after the clinic has received the results. If you do not hear from us within 7 days, please contact the clinic through MyChart or phone. If you have a critical or abnormal lab result, we will notify you by phone as soon as possible.  Submit refill requests through [employer] or call  your pharmacy and they will forward the refill request to us. Please allow 3 business days for your refill to be completed.          Additional Information About Your Visit        Care EveryWhere ID     This is your Care EveryWhere ID. This could be used by other organizations to access your [place] medical records  [id number]        Your Vitals Were     Pulse Temperature Last Period Pulse Oximetry BMI (Body Mass Index)       112 97.5  F (36.4  C) (Temporal) 11/15/2017 (Approximate) 96% 38.45 kg/m2        Blood Pressure from Last 3 Encounters:   07/16/18 122/44   07/02/18 118/48   06/18/18 114/56    Weight from Last 3 Encounters:   07/16/18 260 lb 6.4 oz (118.1 kg)   07/02/18 257 lb 12.8 oz (116.9 kg)   06/18/18 256 lb 9.6 oz (116.4 kg)              We Performed the Following     Strep, Group B by PCR     Wet prep        Primary Care Provider Office Phone # Fax #    Candice Irasema Rivero -[phone]       [address] [place] [address]        Equal Access to Services     [facility]: Hadii jessika santos Somateo, waaxda luqadaha, qaybta kaalmada freddy, minna morillo . So [place] [phone].    ATENCIÓN: Si habla español, tiene a perez disposición servicios gratuitos de asistencia lingüística. [facility] [phone].    We comply with applicable federal civil rights laws and [place] laws. We do not discriminate on the basis of race, color, national origin, age, disability, sex, sexual orientation, or gender identity.            Thank you!     Thank you for choosing [facility]  for your care. Our goal is always to provide you with excellent care. Hearing back from our patients is one way we can continue to improve our services. Please take a few minutes to complete the written survey that you may receive in the mail after your visit with us. Thank you!             Your Updated Medication List - Protect others around you: Learn how to  safely use, store and throw away your medicines at www.disposemymeds.org.          This list is accurate as of 7/16/18  2:06 PM.  Always use your most recent med list.                   Brand Name Dispense Instructions for use Diagnosis    breast pump Misc     1 each    Use every 3 hours as indicated    Postpartum care and examination of lactating mother       prenatal multivitamin plus iron 27-0.8 MG Tabs per tablet      Take 1 tablet by mouth daily

## 2018-07-17 LAB
GP B STREP DNA SPEC QL NAA+PROBE: NEGATIVE
SPECIMEN SOURCE: NORMAL

## 2018-07-17 NOTE — PROGRESS NOTES
Notify Mila that she does have a moderate yeast infection, and at this point in her pregnancy I recommend the vaginal treatment Monistat. I'm going to order it for her to the pharmacy.   It's safe and should work quickly.   Candice Rivero MD

## 2018-07-19 ENCOUNTER — TELEPHONE (OUTPATIENT)
Dept: FAMILY MEDICINE | Facility: CLINIC | Age: 23
End: 2018-07-19

## 2018-07-19 NOTE — TELEPHONE ENCOUNTER
----- Message from Candice Rivero MD sent at 7/18/2018  6:25 PM CDT -----  Notify her that her GBS is negative.  Candice Rivero MD

## 2018-07-23 ENCOUNTER — PRENATAL OFFICE VISIT (OUTPATIENT)
Dept: FAMILY MEDICINE | Facility: CLINIC | Age: 23
End: 2018-07-23
Payer: COMMERCIAL

## 2018-07-23 VITALS
HEIGHT: 69 IN | WEIGHT: 259 LBS | HEART RATE: 80 BPM | BODY MASS INDEX: 38.36 KG/M2 | DIASTOLIC BLOOD PRESSURE: 70 MMHG | TEMPERATURE: 97.2 F | RESPIRATION RATE: 16 BRPM | SYSTOLIC BLOOD PRESSURE: 120 MMHG

## 2018-07-23 DIAGNOSIS — Z34.03 ENCOUNTER FOR SUPERVISION OF NORMAL FIRST PREGNANCY IN THIRD TRIMESTER: Primary | ICD-10-CM

## 2018-07-23 PROCEDURE — 99207 ZZC PRENATAL VISIT: CPT | Performed by: FAMILY MEDICINE

## 2018-07-23 ASSESSMENT — PAIN SCALES - GENERAL: PAINLEVEL: NO PAIN (0)

## 2018-07-23 NOTE — PROGRESS NOTES
Doing well overall. No bleeding or regular contractions.  Since last visit, some intermittent dizziness on Tuesday and Wednesday that resolved with sitting and relaxing. Also feeling muscle fatigue with use of her arms. These have resolved since. Will follow up if this returns.  Fetal lie is cephalic.   Discussed signs and symptoms of labor.  Will follow up in one week or sooner with any concern for decrease in fetal movement, vaginal bleeding, fluid leak, headache, vision change, severe nausea or vomiting, abdominal pain, increased edema or other concerns.   Carmenza CRAWFORD  Pt was personally seen, interviewed and examined by me, chart notes edited and I agree with assessment as documented above.  Candice Rivero MD

## 2018-07-23 NOTE — MR AVS SNAPSHOT
After Visit Summary   7/23/2018    Mila Oden    MRN: [id number]           Patient Information     Date Of Birth          1995        Visit Information        Provider Department      7/23/2018 1:00 PM Candice Rivero MD [facility]        Today's Diagnoses     Encounter for supervision of normal first pregnancy in third trimester    -  1       Follow-ups after your visit        Your next 10 appointments already scheduled     Jul 30, 2018  1:00 PM CDT   ESTABLISHED PRENATAL with Candice Rivero MD   [facility] ([address] [address]   [phone]            Aug 06, 2018  1:00 PM CDT   ESTABLISHED PRENATAL with Candice Rivero MD   [facility] ([address] [address]   [phone]            Aug 13, 2018  1:00 PM CDT   ESTABLISHED PRENATAL with Candice Rivero MD   [facility] ([address] [address]   [phone]              Who to contact     If you have questions or need follow up information about today's clinic visit or your schedule please contact [facility] directly at [phone].  Normal or non-critical lab and imaging results will be communicated to you by MyChart, letter or phone within 4 business days after the clinic has received the results. If you do not hear from us within 7 days, please contact the clinic through MyChart or phone. If you have a critical or abnormal lab result, we will notify you by phone as soon as possible.  Submit refill requests through [email or web address] or call your pharmacy and they will forward the refill request to us. Please allow 3 business days for your refill to be completed.          Additional Information About Your Visit        Care EveryWhere ID     This is your  "Care EveryWhere ID. This could be used by other organizations to access your [place] medical records  [id number]        Your Vitals Were     Pulse Temperature Respirations Height Last Period BMI (Body Mass Index)    80 97.2  F (36.2  C) (Temporal) 16 5' 9\" (1.753 m) 11/15/2017 (Approximate) 38.25 kg/m2       Blood Pressure from Last 3 Encounters:   07/23/18 120/70   07/16/18 122/44   07/02/18 118/48    Weight from Last 3 Encounters:   07/23/18 259 lb (117.5 kg)   07/16/18 260 lb 6.4 oz (118.1 kg)   07/02/18 257 lb 12.8 oz (116.9 kg)              Today, you had the following     No orders found for display       Primary Care Provider Office Phone # Fax #    Candice Irasema Rivero [phone] [address] [place] [address]        Equal Access to Services     [facility] : Hadii aad ku hadasho Soomaali, waaxda luqadaha, qaybta kaalmada adeegyada, waxay idiin haydayne morillo . So [facility] [phone].    ATENCIÓN: Si habla español, tiene a perez disposición servicios gratuitos de asistencia lingüística. Llame al [phone].    We comply with applicable federal civil rights laws and [place] laws. We do not discriminate on the basis of race, color, national origin, age, disability, sex, sexual orientation, or gender identity.            Thank you!     Thank you for choosing [facility]  for your care. Our goal is always to provide you with excellent care. Hearing back from our patients is one way we can continue to improve our services. Please take a few minutes to complete the written survey that you may receive in the mail after your visit with us. Thank you!             Your Updated Medication List - Protect others around you: Learn how to safely use, store and throw away your medicines at www.disposemymeds.org.          This list is accurate as of 7/23/18 11:35 PM.  Always use your most recent med list.                   Brand Name Dispense Instructions " for use Diagnosis    breast pump Misc     1 each    Use every 3 hours as indicated    Postpartum care and examination of lactating mother       miconazole 200 & 2 MG-% (9GM) Kit     1 each    Use once nightly as directed on package, for 3 days.    Yeast infection of the vagina       prenatal multivitamin plus iron 27-0.8 MG Tabs per tablet      Take 1 tablet by mouth daily

## 2018-07-27 ENCOUNTER — HOSPITAL ENCOUNTER (INPATIENT)
Facility: CLINIC | Age: 23
LOS: 2 days | Discharge: HOME | End: 2018-07-30
Attending: FAMILY MEDICINE | Admitting: FAMILY MEDICINE
Payer: COMMERCIAL

## 2018-07-27 ENCOUNTER — TELEPHONE (OUTPATIENT)
Dept: OBGYN | Facility: CLINIC | Age: 23
End: 2018-07-27

## 2018-07-27 PROBLEM — R10.9 ABDOMINAL CRAMPING: Status: ACTIVE | Noted: 2018-07-27

## 2018-07-27 PROCEDURE — 87210 SMEAR WET MOUNT SALINE/INK: CPT | Performed by: FAMILY MEDICINE

## 2018-07-27 PROCEDURE — G0463 HOSPITAL OUTPT CLINIC VISIT: HCPCS

## 2018-07-27 PROCEDURE — 84112 EVAL AMNIOTIC FLUID PROTEIN: CPT | Performed by: FAMILY MEDICINE

## 2018-07-27 NOTE — IP AVS SNAPSHOT
[facility]    [address]     [place] [address]    Phone:  [phone]                                       After Visit Summary   7/27/2018    Mila Oden    MRN: [id number]           After Visit Summary Signature Page     I have received my discharge instructions, and my questions have been answered. I have discussed any challenges I see with this plan with the nurse or doctor.    ..........................................................................................................................................  Patient/Patient Representative Signature      ..........................................................................................................................................  Patient Representative Print Name and Relationship to Patient    ..................................................               ................................................  Date                                            Time    ..........................................................................................................................................  Reviewed by Signature/Title    ...................................................              ..............................................  Date                                                            Time

## 2018-07-27 NOTE — IP AVS SNAPSHOT
MRN:[id number]                      After Visit Summary   7/27/2018    Mila Oden    MRN: [id number]           Thank you!     Thank you for choosing [facility] for your care. Our goal is always to provide you with excellent care. Hearing back from our patients is one way we can continue to improve our services. Please take a few minutes to complete the written survey that you may receive in the mail after you visit with us. Thank you!        Patient Information     Date Of Birth          1995        About your hospital stay     You were admitted on:  July 27, 2018 You last received care in the:  [facility]    You were discharged on:  July 30, 2018       Who to Call     For medical emergencies, please call 911.  For non-urgent questions about your medical care, please call your primary care provider or clinic, [phone]          Attending Provider     Provider Specialty    Kade Hernández MD [facility]       Primary Care Provider Office Phone # Fax #    Candice Rivero [phone]      Your next 10 appointments already scheduled     Aug 06, 2018  1:00 PM CDT   ESTABL[facility] Candice [facility]o MD   [facility]    [address] [place] [id number] [phone]            Aug 13, 2018  1:00 PM CDT   ESTABLISHED PRENATAL with Candice Rivero MD   [facility]    [address] [place] [id number] [phone]              Further instructions from your care team       [facility] Discharge Instructions     Discharge disposition:  Discharged to home       Diet:  Regular       Activity No sex for 6 week(s)       Follow-up: Follow up with Dr. Rivero in 6 weeks       Additional instructions: The birthplace staff is available 24 hours 7 days for questions about you  or your baby.  Please don't hesitate to call with any concerns.        Additional Patient Information:  Enjoy beautiful david Middleton!      Postpartum Vaginal Delivery Instructions    Activity       Ask family and friends for help when you need it.    Do not place anything in your vagina for 6 weeks.    You are not restricted on other activities, but take it easy for a few weeks to allow your body to recover from delivery.  You are able to do any activities you feel up to that point.    No driving until you have stopped taking your pain medications (usually two weeks after delivery).     Call your health care provider if you have any of these symptoms:       Increased pain, swelling, redness, or fluid around your stiches from an episiotomy or perineal tear.    A fever above 100.4 F (38 C) with or without chills when placing a thermometer under your tongue.    You soak a sanitary pad with blood within 1 hour, or you see blood clots larger than a golf ball.    Bleeding that lasts more than 6 weeks.    Vaginal discharge that smells bad.    Severe pain, cramping or tenderness in your lower belly area.    A need to urinate more frequently (use the toilet more often), more urgently (use the toilet very quickly), or it burns when you urinate.    Nausea and vomiting.    Redness, swelling or pain around a vein in your leg.    Problems breastfeeding or a red or painful area on your breast.    Chest pain and cough or are gasping for air.    Problems coping with sadness, anxiety, or depression.  If you have any concerns about hurting yourself or the baby, call your provider immediately.     You have questions or concerns after you return home.     Keep your hands clean:  Always wash your hands before touching your perineal area and stitches.  This helps reduce your risk of infection.  If your hands aren't dirty, you may use an alcohol hand-rub to clean your hands. Keep your nails clean and short.        Pending Results     No  orders found from 7/25/2018 to 7/28/2018.            Statement of Approval     Ordered          07/30/18 0955  I have reviewed and agree with all the recommendations and orders detailed in this document.  EFFECTIVE NOW     Approved and electronically signed by:  Candice Rivero MD             Admission Information     Date & Time Provider Department Dept. Phone    7/27/2018 Kade Hernández MD [facility] [phone]      Your Vitals Were     Blood Pressure Pulse Temperature Respirations Last Period Pulse Oximetry    103/58 75 98  F (36.7  C) (Oral) 16 11/15/2017 (Approximate) 100%      Care EveryWhere ID     This is your Care EveryWhere ID. This could be used by other organizations to access your [facility] medical records  [id number]        Equal Access to Services     SALLY WHIPPLE : Modesto santos Somateo, waaxda luqadaha, qaybta kaalmada luannyabry, minna morillo . [facility] [phone].    ATENCIÓN: Si habla español, tiene a perez disposición servicios gratuitos de asistencia lingüística. Llame al [phone].    We comply with applicable federal civil rights laws and [place] laws. We do not discriminate on the basis of race, color, national origin, age, disability, sex, sexual orientation, or gender identity.               Review of your medicines      START taking        Dose / Directions    acetaminophen 325 MG tablet   Commonly known as:  TYLENOL        Dose:  650 mg   Take 2 tablets (650 mg) by mouth every 4 hours as needed for mild pain or fever (greater than or equal to 38? C /100.4? F (oral) or 38.5? C/ 101.4? F (core).)   Refills:  0       ibuprofen 200 MG tablet   Commonly known as:  ADVIL/MOTRIN        Dose:  600-800 mg   Take 3-4 tablets (600-800 mg) by mouth every 6 hours as needed for other (cramping)   Refills:  0       lanolin ointment        Use as needed for dry, cracked or sore nipples. No need to wash off.   Quantity:  40  g   Refills:  1       senna-docusate 8.6-50 MG per tablet   Commonly known as:  SENOKOT-S;PERICOLACE        Dose:  1 tablet   Take 1 tablet by mouth 2 times daily as needed for constipation   Quantity:  60 tablet   Refills:  0         CONTINUE these medicines which have NOT CHANGED        Dose / Directions    breast pump Misc        Use every 3 hours as indicated   Quantity:  1 each   Refills:  0       prenatal multivitamin plus iron 27-0.8 MG Tabs per tablet        Dose:  1 tablet   Take 1 tablet by mouth daily   Refills:  0            Where to get your medicines      These medications were sent to [facility] [place], [place] - [address]   [address] , [facility] [address]     Phone:  [phone]     lanolin ointment    senna-docusate 8.6-50 MG per tablet         Some of these will need a paper prescription and others can be bought over the counter. Ask your nurse if you have questions.     You don't need a prescription for these medications     acetaminophen 325 MG tablet    ibuprofen 200 MG tablet                Protect others around you: Learn how to safely use, store and throw away your medicines at www.disposemymeds.org.             Medication List: This is a list of all your medications and when to take them. Check marks below indicate your daily home schedule. Keep this list as a reference.      Medications           Morning Afternoon Evening Bedtime As Needed    acetaminophen 325 MG tablet   Commonly known as:  TYLENOL   Take 2 tablets (650 mg) by mouth every 4 hours as needed for mild pain or fever (greater than or equal to 38? C /100.4? F (oral) or 38.5? C/ 101.4? F (core).)                                breast pump Misc   Use every 3 hours as indicated                                ibuprofen 200 MG tablet   Commonly known as:  ADVIL/MOTRIN   Take 3-4 tablets (600-800 mg) by mouth every 6 hours as needed for other (cramping)   Last time this was given:  800 mg on 7/29/2018   9:07 PM                                lanolin ointment   Use as needed for dry, cracked or sore nipples. No need to wash off.   Last time this was given:  7/28/2018  9:18 PM                                prenatal multivitamin plus iron 27-0.8 MG Tabs per tablet   Take 1 tablet by mouth daily   Last time this was given:  1 tablet on 7/29/2018  9:17 AM                                senna-docusate 8.6-50 MG per tablet   Commonly known as:  SENOKOT-S;PERICOLACE   Take 1 tablet by mouth 2 times daily as needed for constipation   Last time this was given:  1 tablet on 7/30/2018  8:35 AM

## 2018-07-28 ENCOUNTER — ANESTHESIA EVENT (OUTPATIENT)
Dept: OBGYN | Facility: CLINIC | Age: 23
End: 2018-07-28
Payer: COMMERCIAL

## 2018-07-28 ENCOUNTER — ANESTHESIA (OUTPATIENT)
Dept: OBGYN | Facility: CLINIC | Age: 23
End: 2018-07-28
Payer: COMMERCIAL

## 2018-07-28 PROBLEM — O42.90 AMNIOTIC FLUID LEAKING: Status: ACTIVE | Noted: 2018-07-28

## 2018-07-28 LAB
RUPTURE OF FETAL MEMBRANES BY ROM PLUS: POSITIVE
SPECIMEN SOURCE: NORMAL
T PALLIDUM AB SER QL: NONREACTIVE
WET PREP SPEC: NORMAL

## 2018-07-28 PROCEDURE — 25000132 ZZH RX MED GY IP 250 OP 250 PS 637: Performed by: FAMILY MEDICINE

## 2018-07-28 PROCEDURE — 25000125 ZZHC RX 250: Performed by: NURSE ANESTHETIST, CERTIFIED REGISTERED

## 2018-07-28 PROCEDURE — 25000125 ZZHC RX 250

## 2018-07-28 PROCEDURE — 40000671 ZZH STATISTIC ANESTHESIA CASE

## 2018-07-28 PROCEDURE — 59409 OBSTETRICAL CARE: CPT | Performed by: FAMILY MEDICINE

## 2018-07-28 PROCEDURE — 72200001 ZZH LABOR CARE VAGINAL DELIVERY SINGLE

## 2018-07-28 PROCEDURE — 86901 BLOOD TYPING SEROLOGIC RH(D): CPT | Performed by: FAMILY MEDICINE

## 2018-07-28 PROCEDURE — 25000128 H RX IP 250 OP 636: Performed by: NURSE ANESTHETIST, CERTIFIED REGISTERED

## 2018-07-28 PROCEDURE — 12000031 ZZH R&B OB CRITICAL

## 2018-07-28 PROCEDURE — 86900 BLOOD TYPING SEROLOGIC ABO: CPT | Performed by: FAMILY MEDICINE

## 2018-07-28 PROCEDURE — 86780 TREPONEMA PALLIDUM: CPT | Performed by: FAMILY MEDICINE

## 2018-07-28 PROCEDURE — 25000128 H RX IP 250 OP 636: Performed by: FAMILY MEDICINE

## 2018-07-28 PROCEDURE — 37000011 ZZH ANESTHESIA WARD SERVICE: Performed by: NURSE ANESTHETIST, CERTIFIED REGISTERED

## 2018-07-28 PROCEDURE — 0HQ9XZZ REPAIR PERINEUM SKIN, EXTERNAL APPROACH: ICD-10-PCS | Performed by: FAMILY MEDICINE

## 2018-07-28 RX ORDER — LIDOCAINE HYDROCHLORIDE 20 MG/ML
INJECTION, SOLUTION INFILTRATION; PERINEURAL PRN
Status: DISCONTINUED | OUTPATIENT
Start: 2018-07-28 | End: 2018-07-28

## 2018-07-28 RX ORDER — NALOXONE HYDROCHLORIDE 0.4 MG/ML
.1-.4 INJECTION, SOLUTION INTRAMUSCULAR; INTRAVENOUS; SUBCUTANEOUS
Status: DISCONTINUED | OUTPATIENT
Start: 2018-07-28 | End: 2018-07-30 | Stop reason: HOSPADM

## 2018-07-28 RX ORDER — LIDOCAINE HYDROCHLORIDE 10 MG/ML
INJECTION, SOLUTION EPIDURAL; INFILTRATION; INTRACAUDAL; PERINEURAL
Status: COMPLETED
Start: 2018-07-28 | End: 2018-07-28

## 2018-07-28 RX ORDER — ACETAMINOPHEN 325 MG/1
650 TABLET ORAL EVERY 4 HOURS PRN
Status: DISCONTINUED | OUTPATIENT
Start: 2018-07-28 | End: 2018-07-30 | Stop reason: HOSPADM

## 2018-07-28 RX ORDER — ACETAMINOPHEN 325 MG/1
650 TABLET ORAL EVERY 4 HOURS PRN
Status: DISCONTINUED | OUTPATIENT
Start: 2018-07-28 | End: 2018-07-28

## 2018-07-28 RX ORDER — PRENATAL VIT/IRON FUM/FOLIC AC 27MG-0.8MG
1 TABLET ORAL DAILY
Status: DISCONTINUED | OUTPATIENT
Start: 2018-07-28 | End: 2018-07-30 | Stop reason: HOSPADM

## 2018-07-28 RX ORDER — SODIUM CHLORIDE, SODIUM LACTATE, POTASSIUM CHLORIDE, CALCIUM CHLORIDE 600; 310; 30; 20 MG/100ML; MG/100ML; MG/100ML; MG/100ML
INJECTION, SOLUTION INTRAVENOUS CONTINUOUS
Status: DISCONTINUED | OUTPATIENT
Start: 2018-07-28 | End: 2018-07-28

## 2018-07-28 RX ORDER — BISACODYL 10 MG
10 SUPPOSITORY, RECTAL RECTAL DAILY PRN
Status: DISCONTINUED | OUTPATIENT
Start: 2018-07-30 | End: 2018-07-30 | Stop reason: HOSPADM

## 2018-07-28 RX ORDER — AMOXICILLIN 250 MG
1 CAPSULE ORAL 2 TIMES DAILY
Status: DISCONTINUED | OUTPATIENT
Start: 2018-07-28 | End: 2018-07-30 | Stop reason: HOSPADM

## 2018-07-28 RX ORDER — OXYCODONE AND ACETAMINOPHEN 5; 325 MG/1; MG/1
1 TABLET ORAL
Status: DISCONTINUED | OUTPATIENT
Start: 2018-07-28 | End: 2018-07-28

## 2018-07-28 RX ORDER — NALBUPHINE HYDROCHLORIDE 10 MG/ML
2.5-5 INJECTION, SOLUTION INTRAMUSCULAR; INTRAVENOUS; SUBCUTANEOUS EVERY 6 HOURS PRN
Status: DISCONTINUED | OUTPATIENT
Start: 2018-07-28 | End: 2018-07-28

## 2018-07-28 RX ORDER — CARBOPROST TROMETHAMINE 250 UG/ML
250 INJECTION, SOLUTION INTRAMUSCULAR
Status: DISCONTINUED | OUTPATIENT
Start: 2018-07-28 | End: 2018-07-28

## 2018-07-28 RX ORDER — OXYTOCIN/0.9 % SODIUM CHLORIDE 30/500 ML
100 PLASTIC BAG, INJECTION (ML) INTRAVENOUS CONTINUOUS
Status: DISCONTINUED | OUTPATIENT
Start: 2018-07-28 | End: 2018-07-30 | Stop reason: HOSPADM

## 2018-07-28 RX ORDER — METHYLERGONOVINE MALEATE 0.2 MG/ML
200 INJECTION INTRAVENOUS
Status: DISCONTINUED | OUTPATIENT
Start: 2018-07-28 | End: 2018-07-28

## 2018-07-28 RX ORDER — FENTANYL CITRATE 50 UG/ML
50-100 INJECTION, SOLUTION INTRAMUSCULAR; INTRAVENOUS
Status: DISCONTINUED | OUTPATIENT
Start: 2018-07-28 | End: 2018-07-28

## 2018-07-28 RX ORDER — EPHEDRINE SULFATE 50 MG/ML
5 INJECTION, SOLUTION INTRAMUSCULAR; INTRAVENOUS; SUBCUTANEOUS
Status: DISCONTINUED | OUTPATIENT
Start: 2018-07-28 | End: 2018-07-28

## 2018-07-28 RX ORDER — LIDOCAINE HYDROCHLORIDE AND EPINEPHRINE 15; 5 MG/ML; UG/ML
INJECTION, SOLUTION EPIDURAL PRN
Status: DISCONTINUED | OUTPATIENT
Start: 2018-07-28 | End: 2018-07-28

## 2018-07-28 RX ORDER — AMOXICILLIN 250 MG
2 CAPSULE ORAL 2 TIMES DAILY
Status: DISCONTINUED | OUTPATIENT
Start: 2018-07-28 | End: 2018-07-30 | Stop reason: HOSPADM

## 2018-07-28 RX ORDER — IBUPROFEN 800 MG/1
800 TABLET, FILM COATED ORAL EVERY 6 HOURS PRN
Status: DISCONTINUED | OUTPATIENT
Start: 2018-07-28 | End: 2018-07-30 | Stop reason: HOSPADM

## 2018-07-28 RX ORDER — OXYTOCIN 10 [USP'U]/ML
10 INJECTION, SOLUTION INTRAMUSCULAR; INTRAVENOUS
Status: COMPLETED | OUTPATIENT
Start: 2018-07-28 | End: 2018-07-28

## 2018-07-28 RX ORDER — NALOXONE HYDROCHLORIDE 0.4 MG/ML
.1-.4 INJECTION, SOLUTION INTRAMUSCULAR; INTRAVENOUS; SUBCUTANEOUS
Status: DISCONTINUED | OUTPATIENT
Start: 2018-07-28 | End: 2018-07-28

## 2018-07-28 RX ORDER — OXYTOCIN 10 [USP'U]/ML
10 INJECTION, SOLUTION INTRAMUSCULAR; INTRAVENOUS
Status: DISCONTINUED | OUTPATIENT
Start: 2018-07-28 | End: 2018-07-30 | Stop reason: HOSPADM

## 2018-07-28 RX ORDER — HYDROCODONE BITARTRATE AND ACETAMINOPHEN 5; 325 MG/1; MG/1
1 TABLET ORAL EVERY 4 HOURS PRN
Status: DISCONTINUED | OUTPATIENT
Start: 2018-07-28 | End: 2018-07-30 | Stop reason: HOSPADM

## 2018-07-28 RX ORDER — OXYTOCIN/0.9 % SODIUM CHLORIDE 30/500 ML
340 PLASTIC BAG, INJECTION (ML) INTRAVENOUS CONTINUOUS PRN
Status: DISCONTINUED | OUTPATIENT
Start: 2018-07-28 | End: 2018-07-30 | Stop reason: HOSPADM

## 2018-07-28 RX ORDER — OXYTOCIN/0.9 % SODIUM CHLORIDE 30/500 ML
100-340 PLASTIC BAG, INJECTION (ML) INTRAVENOUS CONTINUOUS PRN
Status: DISCONTINUED | OUTPATIENT
Start: 2018-07-28 | End: 2018-07-28

## 2018-07-28 RX ORDER — ONDANSETRON 2 MG/ML
4 INJECTION INTRAMUSCULAR; INTRAVENOUS EVERY 6 HOURS PRN
Status: DISCONTINUED | OUTPATIENT
Start: 2018-07-28 | End: 2018-07-28

## 2018-07-28 RX ORDER — MISOPROSTOL 200 UG/1
400 TABLET ORAL
Status: DISCONTINUED | OUTPATIENT
Start: 2018-07-28 | End: 2018-07-30 | Stop reason: HOSPADM

## 2018-07-28 RX ORDER — IBUPROFEN 800 MG/1
800 TABLET, FILM COATED ORAL
Status: DISCONTINUED | OUTPATIENT
Start: 2018-07-28 | End: 2018-07-28

## 2018-07-28 RX ORDER — HYDROCORTISONE 2.5 %
CREAM (GRAM) TOPICAL 3 TIMES DAILY PRN
Status: DISCONTINUED | OUTPATIENT
Start: 2018-07-28 | End: 2018-07-30 | Stop reason: HOSPADM

## 2018-07-28 RX ORDER — BUPIVACAINE HYDROCHLORIDE 2.5 MG/ML
INJECTION, SOLUTION INFILTRATION; PERINEURAL PRN
Status: DISCONTINUED | OUTPATIENT
Start: 2018-07-28 | End: 2018-07-28

## 2018-07-28 RX ORDER — LANOLIN 100 %
OINTMENT (GRAM) TOPICAL
Status: DISCONTINUED | OUTPATIENT
Start: 2018-07-28 | End: 2018-07-30 | Stop reason: HOSPADM

## 2018-07-28 RX ADMIN — Medication: at 21:18

## 2018-07-28 RX ADMIN — LIDOCAINE HYDROCHLORIDE,EPINEPHRINE BITARTRATE 3 ML: 15; .005 INJECTION, SOLUTION EPIDURAL; INFILTRATION; INTRACAUDAL; PERINEURAL at 05:01

## 2018-07-28 RX ADMIN — BUPIVACAINE HYDROCHLORIDE: 5 INJECTION, SOLUTION EPIDURAL; INTRACAUDAL; PERINEURAL at 05:10

## 2018-07-28 RX ADMIN — BUPIVACAINE HYDROCHLORIDE 10 ML/HR: 5 INJECTION, SOLUTION EPIDURAL; INTRACAUDAL; PERINEURAL at 05:11

## 2018-07-28 RX ADMIN — SODIUM CHLORIDE, POTASSIUM CHLORIDE, SODIUM LACTATE AND CALCIUM CHLORIDE 1000 ML: 600; 310; 30; 20 INJECTION, SOLUTION INTRAVENOUS at 01:00

## 2018-07-28 RX ADMIN — ONDANSETRON 4 MG: 2 INJECTION INTRAMUSCULAR; INTRAVENOUS at 01:17

## 2018-07-28 RX ADMIN — IBUPROFEN 800 MG: 800 TABLET ORAL at 11:15

## 2018-07-28 RX ADMIN — BUPIVACAINE HYDROCHLORIDE 10 ML: 2.5 INJECTION, SOLUTION EPIDURAL; INFILTRATION; INTRACAUDAL; PERINEURAL at 05:07

## 2018-07-28 RX ADMIN — LIDOCAINE HYDROCHLORIDE 20 MG: 10 INJECTION, SOLUTION EPIDURAL; INFILTRATION; INTRACAUDAL; PERINEURAL at 01:12

## 2018-07-28 RX ADMIN — FENTANYL CITRATE 100 MCG: 50 INJECTION INTRAMUSCULAR; INTRAVENOUS at 01:19

## 2018-07-28 RX ADMIN — LIDOCAINE HYDROCHLORIDE 5 ML: 20 INJECTION, SOLUTION INFILTRATION; PERINEURAL at 05:55

## 2018-07-28 RX ADMIN — SODIUM CHLORIDE, POTASSIUM CHLORIDE, SODIUM LACTATE AND CALCIUM CHLORIDE: 600; 310; 30; 20 INJECTION, SOLUTION INTRAVENOUS at 05:47

## 2018-07-28 RX ADMIN — FENTANYL CITRATE 100 MCG: 50 INJECTION INTRAMUSCULAR; INTRAVENOUS at 02:40

## 2018-07-28 RX ADMIN — SENNOSIDES AND DOCUSATE SODIUM 2 TABLET: 8.6; 5 TABLET ORAL at 21:19

## 2018-07-28 RX ADMIN — OXYTOCIN 10 UNITS: 10 INJECTION, SOLUTION INTRAMUSCULAR; INTRAVENOUS at 09:18

## 2018-07-28 RX ADMIN — IBUPROFEN 800 MG: 800 TABLET ORAL at 21:18

## 2018-07-28 RX ADMIN — FENTANYL CITRATE 100 MCG: 50 INJECTION INTRAMUSCULAR; INTRAVENOUS at 04:27

## 2018-07-28 RX ADMIN — SODIUM CHLORIDE, POTASSIUM CHLORIDE, SODIUM LACTATE AND CALCIUM CHLORIDE: 600; 310; 30; 20 INJECTION, SOLUTION INTRAVENOUS at 02:45

## 2018-07-28 NOTE — PLAN OF CARE
Problem: Labor (Cervical Ripen, Induct, Augment) (Adult,Obstetrics,Pediatric)  Goal: Signs and Symptoms of Listed Potential Problems Will be Absent, Minimized or Managed (Labor)  Signs and symptoms of listed potential problems will be absent, minimized or managed by discharge/transition of care (reference Labor (Cervical Ripen, Induct, Augment) (Adult,Obstetrics,Pediatric) CPG).   Outcome: Improving  Pt complete/+1. Dr. Hernández notified, room set up for delivery. Light meconium noted. FHTs category 1.

## 2018-07-28 NOTE — H&P
"  2018    Mila Oden  [id number]            OB Admit History & Physical      Ms. Oden  is here in spontaneous labor.    She has noticed some contractions starting last evening about 6 PM.  These gradually worsened and she had some vaginal discharge.  She came to labor and delivery to be evaluated and had SROM while in triage.  I was contacted for admission orders.  She has continued to labor spontaneously since that time, requiring epidural analgesia.    Patient's last menstrual period was 11/15/2017 (approximate).   Her Estimated Date of Delivery: Aug 10, 2018  , making her 38w1d  wks.      Estimated body mass index is 38.25 kg/(m^2) as calculated from the following:    Height as of 18: 5' 9\" (1.753 m).    Weight as of 18: 259 lb (117.5 kg).  Her prenatal course has been complicated by nothing.    See prenatal for labs.  Negative GBBS, Rubella Immune, RH positive    Estimated fetal weight= 3400 gm       She is a 22 year old   Her OB history:   Obstetric History       T0      L0     SAB0   TAB0   Ectopic0   Multiple0   Live Births0       # Outcome Date GA Lbr Sukumar/2nd Weight Sex Delivery Anes PTL Lv   1 Current               Obstetric Comments   EDC 8/10/2018 based on early u/s.  Parenting with Fei.            Past Medical History:   Diagnosis Date     Otitis media           Past Surgical History:   Procedure Laterality Date     ENT SURGERY      L tympanoplasty x 3         No current outpatient prescriptions on file.       Allergies: Penicillins      REVIEW OF SYSTEMS:  NEUROLOGIC:  Negative  EYES:  Negative  ENT:  Negative  GI:  Negative  BREAST:  Negative  :  Negative  GYN:  Negative  CV:  Negative  PULMONARY:  Negative  MUSCULOSKELETAL:  Negative  PSYCH:  Negative        Social History     Social History     Marital status: Single     Spouse name: Raysa     Number of children: 0     Years of education: N/A     Occupational History      [employer] "     Social History Main Topics     Smoking status: Passive Smoke Exposure - Never Smoker     Smokeless tobacco: Never Used      Comment: around smoke passively     Alcohol use No     Drug use: No     Sexual activity: Yes     Partners: Male     Other Topics Concern      Service No     Blood Transfusions No     Caffeine Concern No     Occupational Exposure No     Hobby Hazards No     Sleep Concern No     Stress Concern No     Weight Concern No     Special Diet No     Back Care No     Exercise No     Bike Helmet No     Seat Belt Yes     Self-Exams No     Social History Narrative    1/2018  Lives in [place] with S.O., Raysa.  Raysa smokes.  Advised about secondhand smoke during pregnancy precautions.  She has indoor cats.  Aware of toxoplasmosis precautions.  No concerns about domestic violence.  Mila works at [employer] in [employer].      Family History   Problem Relation Age of Onset     Arrhythmia Mother      a.fib     Crohn Disease Mother      Other - See Comments Father      doesn't know dad or dad's side of family history     Cerebrovascular Disease Maternal Grandmother      Alzheimer Disease Maternal Grandfather              Vitals:   , moderate variability  With contractions every  3 min    Alert Awake in NAD  HEENT grossly normal  Neck: no lymphadenopathy or thryoidomegaly  Lungs CTAB  Back no spinal or CVAT  Heart RRR no MRG  ABD gravid, non-tender on exam with fundus palpable  Pelvic:  Light Meconium-stained fluid noted, trace blood noted  Cervix is 10 cm / 100 % effaced at +3 station  EXT:  No edema or calf tenderness  Neuro:  Grossly intact    Assessment:  IUP at 38w1d  In spontaneous labor with SROM, now with good epidural analgesia and light-meconium-stained fluid.    Plan:  Continue normal labor process.  Will push and watch for complications related to meconium.    [unfilled]      Kade Hernández MD MD  Dept of OB/GYN  July 28, 2018

## 2018-07-28 NOTE — PROGRESS NOTES
Anesthesia called to room for an epidural.  Consent signed and in the chart. Patient up to void   IV fluid flush going

## 2018-07-28 NOTE — ANESTHESIA PROCEDURE NOTES
Peripheral nerve/Neuraxial procedure note : epidural catheter  Pre-Procedure  Performed by  HARSHIL COLINDRES   Location: OB      Pre-Anesthestic Checklist: patient identified, IV checked, risks and benefits discussed, informed consent, monitors and equipment checked, pre-op evaluation and at physician/surgeon's request    Timeout  Correct Patient: Yes   Correct Procedure: Yes   Correct Site: Yes   Correct Laterality: N/A   Correct Position: Yes   Site Marked: N/A   .   Procedure Documentation    Diagnosis:Active labor.    Procedure:    Epidural catheter.  Insertion Site:L3-4  (midline approach) Injection technique: LORT air   Local skin infiltrated with 3 mL of 1% lidocaine.       Patient Prep;mask, sterile gloves, povidone-iodine 7.5% surgical scrub, patient draped.  .  Needle: Touhy needle, Flannery Needle Gauge: 18.    Needle Length (Inches) 5  # of attempts: 1 and  # of redirects:  1 .   Catheter: 20 G . .  Catheter threaded easily  4 cm epidural space.  .   .    Assessment/Narrative  Paresthesias: No.  .  .  Aspiration negative for heme or CSF  . Test dose of 3 mL lidocaine 1.5% w/ 1:200,000 epinephrine at. Test dose negative for signs of intravascular, subdural or intrathecal injection. Sensory Level Left: T6  Sensory Level Right: T6  Comments:  Risks, benefits and alternatives to epidural catheter placement discussed with patient, aunt and SO. All questions answered. Informed consent obtained. Patient tolerated epidural placement well. She states she is comfortable. No anesthesia complications noted.

## 2018-07-28 NOTE — ANESTHESIA PREPROCEDURE EVALUATION
Anesthesia Evaluation       history and physical reviewed . Pt has not had prior anesthetic     No history of anesthetic complications          ROS/MED HX    ENT/Pulmonary:  - neg pulmonary ROS     Neurologic:  - neg neurologic ROS     Cardiovascular:  - neg cardiovascular ROS       METS/Exercise Tolerance:     Hematologic:         Musculoskeletal:         GI/Hepatic:  - neg GI/hepatic ROS       Renal/Genitourinary:         Endo:         Psychiatric:         Infectious Disease:         Malignancy:         Other:                     Physical Exam  Normal systems: cardiovascular, pulmonary and dental    Airway   Mallampati: II  TM distance: > 3 FB  Neck ROM: full  Mouth opening: > 3 cm    Dental     Cardiovascular       Pulmonary           neg OB ROS                 Anesthesia Plan      History & Physical Review  History and physical reviewed and following examination; no interval change.    ASA Status:  2 .  OB Epidural Asa: 2   NPO Status:  > 4 hours    Plan for Epidural          Postoperative Care      Consents  Anesthetic plan, risks, benefits and alternatives discussed with:  Patient and Spouse.  Use of blood products discussed: No .   .                          .

## 2018-07-28 NOTE — ANESTHESIA POSTPROCEDURE EVALUATION
Patient: Mila Oden    * No procedures listed *    Diagnosis:* No pre-op diagnosis entered *  Diagnosis Additional Information: No value filed.    Anesthesia Type:  Epidural    Note:  Anesthesia Post Evaluation    Patient location during evaluation: Bedside  Patient participation: Able to fully participate in evaluation  Level of consciousness: awake and alert  Pain management: adequate  Cardiovascular status: acceptable  Respiratory status: acceptable  Hydration status: acceptable  PONV: none     Anesthetic complications: None    Comments: Patient was pleased with her anesthesia care today. She states she was comfortable at the time she delivered her baby. She offers no concerns at this time. Vital signs have been stable. No anesthesia complications noted. Will follow if needed.        Last vitals:  Vitals:    07/28/18 1030 07/28/18 1045 07/28/18 1100   BP: 123/59 133/65 123/58   Pulse: 100 100 106   Resp: 16 16 16   Temp:      SpO2:            Electronically Signed By: LEWIS Hastings CRNA  July 28, 2018  12:55 PM

## 2018-07-28 NOTE — ANESTHESIA CARE TRANSFER NOTE
Patient: Mila Oden    * No procedures listed *    Diagnosis: * No pre-op diagnosis entered *  Diagnosis Additional Information: No value filed.    Anesthesia Type:   Epidural     Note:  Airway :Room Air  Patient transferred to:Labor and Delivery  Handoff Report: Identifed the Patient, Identified the Reponsible Provider, Reviewed the pertinent medical history, Discussed the surgical course, Reviewed Intra-OP anesthesia mangement and issues during anesthesia, Set expectations for post-procedure period and Allowed opportunity for questions and acknowledgement of understanding      Vitals: (Last set prior to Anesthesia Care Transfer)              Electronically Signed By: LEWIS Hastings CRNA  July 28, 2018  12:55 PM

## 2018-07-28 NOTE — PROGRESS NOTES
S: Triage Arrival  B: Mila is a 22 y.o.  @ 38w 0d who presents with complaint of low abdominal pains and vaginal discharge.  A: EFM applied. FHT's130 with moderate variability, accels present, no decels noted on strip. Contractions irritability.  Patient has been at the [employer] walking around since 1600. SO reports she has not drank much fluids today. Patient reports discomfort above her bladder area. Having patient drink fluids now. Will collect a wet prep and a UA and send to lab.   R: Will notify MD to obtain further orders.

## 2018-07-28 NOTE — PROGRESS NOTES
S:  Admission  B:  Mila is a  @ 38w1d gestation, GBS -, Hep. B -, pregnancy uncomplicated. EFW 6.5 per MD  A:  Patient admitted to room 333 for SROM at 2340 while in the triage room. Small amount of clear fluid.   R: admitted to room 333

## 2018-07-28 NOTE — PROGRESS NOTES
0119 requested pain medication  SVE 2.5cm 80% -3 mid position.  Fentanyl 100 mcg given with good relief.

## 2018-07-28 NOTE — PROGRESS NOTES
Pt transferred to 359 per Sunni Anderson. Able to stand, but left leg felt too weak to walk. Pt voided. Flow minimal. Oriented to PP POC.

## 2018-07-28 NOTE — PLAN OF CARE
Problem: Labor (Cervical Ripen, Induct, Augment) (Adult,Obstetrics,Pediatric)  Goal: Signs and Symptoms of Listed Potential Problems Will be Absent, Minimized or Managed (Labor)  Signs and symptoms of listed potential problems will be absent, minimized or managed by discharge/transition of care (reference Labor (Cervical Ripen, Induct, Augment) (Adult,Obstetrics,Pediatric) CPG).   Outcome: Completed Date Met: 18  S:  Delivery  B: Mother history: GBS negative with antibiotic treatment. Hepatitis B Negative  A: Baby girl delivered vaginally @ 0912, delayed cord clamping for 1-2 minutes. After cord was clamped and cut, baby was placed skin to skin on mother's chest for bonding within 5 minutes following birth. Apgars 9 & 9. Prior discussion with mother indicates feeding plan is breast:  . Mother educated in breastfeeding cues. Feeding cues were observed and recognized by mother. Breastfeeding initiated at 0945. Breastfeeding assistance was provided.   R: Bonding well with mother and father. Anticipate routine  care.

## 2018-07-28 NOTE — PROGRESS NOTES
Patient sleeping now. Family is also resting.  Supplies in the room for delivery. Patient is now 5cm

## 2018-07-28 NOTE — PROGRESS NOTES
S: Patient desires Epidural  B: Patient is a  who presented for active labor, She is now 2.5 dialated, FHT's 130  A: FREEMAN Bowden CRNA called and in room at 0450. Patient and procedure correctly identified/verified with CRNA. Consent signed. IV fluid bolus given. Patient in position for epidural placement. Epidural placed without complications. Test dose/bolus given by CRNA and patient tolerated well. Patient rates her pain after procedure as 0/10.  R: Will continue to monitor.

## 2018-07-29 LAB
ABO + RH BLD: NORMAL
ABO + RH BLD: NORMAL
SPECIMEN EXP DATE BLD: NORMAL

## 2018-07-29 PROCEDURE — 25000132 ZZH RX MED GY IP 250 OP 250 PS 637: Performed by: FAMILY MEDICINE

## 2018-07-29 PROCEDURE — 12000031 ZZH R&B OB CRITICAL

## 2018-07-29 RX ADMIN — IBUPROFEN 800 MG: 800 TABLET ORAL at 21:07

## 2018-07-29 RX ADMIN — SENNOSIDES AND DOCUSATE SODIUM 1 TABLET: 8.6; 5 TABLET ORAL at 21:07

## 2018-07-29 RX ADMIN — PRENATAL VIT W/ FE FUMARATE-FA TAB 27-0.8 MG 1 TABLET: 27-0.8 TAB at 09:17

## 2018-07-29 RX ADMIN — SENNOSIDES AND DOCUSATE SODIUM 1 TABLET: 8.6; 5 TABLET ORAL at 09:18

## 2018-07-29 NOTE — L&D DELIVERY NOTE
OB Vaginal Delivery Note    Mila Oden MRN# [id number]   Age: 22 year old YOB: 1995       GA: 38w1d  GP:   Labor Complications: None   EBL:    mL  QBL: 258 mL  Delivery Type: Vaginal, Spontaneous Delivery   ROM to Delivery Time: rupture date, rupture time, delivery date, or delivery time have not been documented  [place] Weight: 3.095 kg (6 lb 13.2 oz)    1 Minute 5 Minute 10 Minute   Apgar Totals: 9    9          ROSAMARIA OSCAR;JOSE LUIS BROWN     Pt presented to labor and delivery in spontaneous labor at term with SROM.  She labored without intervention other than requiring epidural analgesia.      Delivery Details:  Mila Oden, a 22 year old  female delivered a viable infant with apgars of 9   and 9  . Patient was fully dilated and pushing after 2  hours 16  minutes in active labor. She pushed for about 20 minutes.  Delivery was via vaginal, spontaneous delivery  to a sterile field under epidural  anesthesia. Infant delivered in vertex  middle  occiput  anterior  position. Anterior and posterior shoulders delivered without difficulty. The cord was clamped, cut twice and 3 vessels  were noted. Cord blood was obtained in routine fashion with the following disposition: lab .      Cord complications: none   Placenta delivered at   9:18 AM . Placental disposition was Hospital disposal . Fundal massage performed and fundus found to be firm.     Episiotomy: none    Perineum, vagina, cervix were inspected, and the following lacerations were noted:   Perineal lacerations: 1st                     Laceration didn't require repair, was very small.    Excellent hemostasis was noted. Needle count correct. Infant and patient in delivery room in good and stable condition.         Labor Event Times    Labor onset date:  18 Onset time:   5:44 AM   Dilation complete date:  18 Complete time:   8:00 AM   Start pushing date/time:  2018 0900            Labor Length    1st Stage (hrs):   "2 (min):  16   2nd Stage (hrs):  1 (min):  12   3rd Stage (hrs):  0 (min):  6      Labor Events     labor?:  No    steroids:  None   Labor Type:  Spontaneous      Antibiotics received during labor?:  No      Rupture date/time:     Rupture type:  Spontaneous rupture of membranes occuring during spontaneous labor or augmentation   Fluid color:  Meconium   Fluid odor:  Normal      1:1 continuous labor support provided by?:  RN          Delivery/Placenta Date and Time    Delivery Date:  18 Delivery Time:   9:12 AM   Placenta Date/Time:  2018  9:18 AM   Oxytocin given at the time of delivery:  after delivery of placenta      Vaginal Counts    Initial count performed by 2 team members:   Two Team Members   TROY Bonilla MD          Needles Suture [place] Sponges Instruments   Initial counts 2 0 5    Added to count 0 0 0    Final counts 2 0 5       Placed during labor Accounted for at the end of labor   No NA   No NA   No NA      Final count performed by 2 team members:   Two Team Members   TROY Bonilla MD         Final count correct?:  Yes         Apgars    Living status:  Living    1 Minute 5 Minute 10 Minute 15 Minute 20 Minute   Skin color: 1  1       Heart rate: 2  2       Reflex irritability: 2  2       Muscle tone: 2  2       Respiratory effort: 2  2       Total: 9  9          Apgars assigned by:  ROSAMARIA OSCAR RN      Cord    Vessels:  3 Vessels Complications:  None   Cord Blood Disposition:  Lab Gases Sent?:  No         [place] Resuscitation    Methods:  None      Output in Delivery Room:  Stool       Measurements    Weight:  6 lb 13.2 oz Length:  1' 9\"   Head circumference:  33 cm Chest circumference:  33 cm      Skin to Skin and Feeding Plan    Skin to skin initiation date/time: 18 0912   Skin to skin with:  Mother   Skin to skin end date/time:     How do you plan to feed your baby:  Breastfeeding      Labor Events and Shoulder Dystocia  "   Fetal Tracing Prior to Delivery:  Category 2   Shoulder dystocia present?:  Neg            Delivery (Maternal) (Provider to Complete) ([id number])    Episiotomy:  None   Perineal lacerations:  1st Repaired?:  No   Vaginal laceration?:  No    Cervical laceration?:  No          Mother's Information  Mother: Mila Oden #[id number]    Start of Mother's Information     IO Blood Loss  07/28/18 0544 - 07/28/18 2151    Mom's I/O Activity            End of Mother's Information  Mother: Mila Oden #[id number]            Delivery - Provider to Complete ([id number])    Delivering clinician:  KADE CARNES   Attempted Delivery Types (Choose all that apply):  Spontaneous Vaginal Delivery   Delivery Type (Choose the 1 that will go to the Birth History):  Vaginal, Spontaneous Delivery                     Other personnel:   Provider Role   ROSAMARIA OSCAR Registered Nurse   JOSE LUIS BROWN Registered Nurse            Placenta    Delayed Cord Clamping:  Done   Date/Time:  7/28/2018  9:18 AM   Removal:  Spontaneous   Disposition:  Hospital disposal      Anesthesia    Method:  Epidural         Presentation and Position    Presentation:  Vertex   Position:  Middle Occiput Anterior                    Kade Carnes MD, MD

## 2018-07-29 NOTE — PROGRESS NOTES
[facility]    Obstetrics Daily Post-Op Note    Assessment & Plan      -* No surgery found *    Doing well.    Plan:  -Ambulate  -Advance activity as tolerated  -Continue supportive and symptomatic treatment  -Pain control measures  -Advance diet as tolerated  -Routine wound care  -     Active Problems:    Abdominal cramping    Amniotic fluid leaking      Mihai Trejo    Subjective:  Interval History   Stable.  Doing well.  Improving slowly.  Pain is reasonably controlled.  No fevers. Lochia as expected for this stage.       Exam:  Physical Exam   Vitals:    07/28/18 1045 07/28/18 1100 07/28/18 1530 07/28/18 1940   BP: 133/65 123/58 122/60 127/69   Pulse: 100 106 100 90   Resp: 16 16 16 14   Temp:   97.9  F (36.6  C) 98.1  F (36.7  C)   TempSrc:   Oral Oral   SpO2:           Constitutional: healthy, alert and no distress  Abdomen:  Uterine fundus is firm, expected tenderness at the level of the umbilicus, incision:   Extremeties:  No edema, pulses intact, scd's in place      Medications     - MEDICATION INSTRUCTIONS -       NO Rho (D) immune globulin (RhoGam) needed - mother Rh POSITIVE       - MEDICATION INSTRUCTIONS -       oxytocin in 0.9% NaCl       oxytocin in 0.9% NaCl          prenatal multivitamin plus iron  1 tablet Oral Daily     senna-docusate  1 tablet Oral BID    Or     senna-docusate  2 tablet Oral BID       Data   Hemoglobin   Date Value Ref Range Status   05/07/2018 13.5 11.7 - 15.7 g/dL Final   01/29/2018 14.5 11.7 - 15.7 g/dL Final     No results found for: RUBELLAABIGG   Lab Results   Component Value Date    ABO O 07/28/2018           Lab Results   Component Value Date    RH Pos 07/28/2018      No results found for: GLC      Mihai Trejo  Pg :[phone]

## 2018-07-29 NOTE — PLAN OF CARE
Problem: Patient Care Overview  Goal: Plan of Care/Patient Progress Review  Outcome: Improving  S: Shift review   B:Mila is a  who delivered vaginally on 18 at 0912, baby girl, breast feeding.  A: VSS, patient is independent with mobility, pain well controlled with p.o. pain meds. Breast feeding with a shield,   Needs assistance with positioning and latching baby on. Patient will work on independence during the day and trying   To latch without a shield.   R: Continue with routine PP care.

## 2018-07-29 NOTE — PLAN OF CARE
Problem: Postpartum (Vaginal Delivery) (Adult,Obstetrics,Pediatric)  Goal: Signs and Symptoms of Listed Potential Problems Will be Absent, Minimized or Managed (Postpartum)  Signs and symptoms of listed potential problems will be absent, minimized or managed by discharge/transition of care (reference Postpartum (Vaginal Delivery) (Adult,Obstetrics,Pediatric) CPG).   Outcome: Improving  S: Status  B: Second day post partum  A: VSS. Flow minimal. No complaints of pain. Breastfeeding with minimal assistance. Pumping after some feeds, by choice. WAtched BF video. Education complete. Confident with  cares.  R: Anticipate discharge tomorrow.

## 2018-07-30 VITALS
HEART RATE: 75 BPM | SYSTOLIC BLOOD PRESSURE: 103 MMHG | OXYGEN SATURATION: 100 % | RESPIRATION RATE: 16 BRPM | DIASTOLIC BLOOD PRESSURE: 58 MMHG | TEMPERATURE: 98 F

## 2018-07-30 PROCEDURE — 25000132 ZZH RX MED GY IP 250 OP 250 PS 637: Performed by: FAMILY MEDICINE

## 2018-07-30 RX ORDER — AMOXICILLIN 250 MG
1 CAPSULE ORAL 2 TIMES DAILY PRN
Qty: 60 TABLET | Refills: 0 | Status: SHIPPED | OUTPATIENT
Start: 2018-07-30 | End: 2018-09-10

## 2018-07-30 RX ORDER — ACETAMINOPHEN 325 MG/1
650 TABLET ORAL EVERY 4 HOURS PRN
COMMUNITY
Start: 2018-07-30 | End: 2018-09-10

## 2018-07-30 RX ORDER — LANOLIN 100 %
OINTMENT (GRAM) TOPICAL
Qty: 40 G | Refills: 1 | Status: SHIPPED | OUTPATIENT
Start: 2018-07-30 | End: 2018-09-10

## 2018-07-30 RX ORDER — IBUPROFEN 200 MG
600-800 TABLET ORAL EVERY 6 HOURS PRN
COMMUNITY
Start: 2018-07-30 | End: 2018-09-10

## 2018-07-30 RX ADMIN — SENNOSIDES AND DOCUSATE SODIUM 1 TABLET: 8.6; 5 TABLET ORAL at 08:35

## 2018-07-30 NOTE — PROGRESS NOTES
S: Shift review   B:Mila is a  who delivered vaginally on 18  A: VSS, patient is independent with mobility, pain well controlled with p.o. pain meds. Handles baby with confidence.  R: Continue with routine PP care.  Plan on discharge today.

## 2018-07-30 NOTE — PROGRESS NOTES
S-(situation): Discharge  to home    B-(background): Patient had a Vaginal delivery with no complications. Baby girl Baby's name Kane, breast: . Support person's name is Raysa.      A-(assessment): Pt discharged to home with .  Pt independent with self and  cares at time of discharge.  Breastfeeding going well.   has an elevated bilirubin.  Patient will bring  in tomorrow for a bilirubin recheck.     R-(recommendations):   Discharge instructions reviewed and questions answered.  Belongings gathered and returned to the patient. Agreed to follow up in 6 weeks or sooner with any question or concerns.     Nursing Discharge Checklist:    Pneumovax screened and given, if appropriate: N/A  Influenza vaccine screened and given, if appropriate: N/A  Staples removed (): N/A  Breast milk returned: N/A  Hydrogel pads sent home:YES  Birth Certificate Done: YES.  ROP done.

## 2018-07-30 NOTE — DISCHARGE INSTRUCTIONS
[facility] Discharge Instructions     Discharge disposition:  Discharged to home       Diet:  Regular       Activity No sex for 6 week(s)       Follow-up: Follow up with Dr. Rivero in 6 weeks       Additional instructions: The [facility] staff is available 24 hours 7 days for questions about you or your baby.  Please don't hesitate to call with any concerns.        Additional Patient Information:  Enjoy beautiful david [place]!      Postpartum Vaginal Delivery Instructions    Activity       Ask family and friends for help when you need it.    Do not place anything in your vagina for 6 weeks.    You are not restricted on other activities, but take it easy for a few weeks to allow your body to recover from delivery.  You are able to do any activities you feel up to that point.    No driving until you have stopped taking your pain medications (usually two weeks after delivery).     Call your health care provider if you have any of these symptoms:       Increased pain, swelling, redness, or fluid around your stiches from an episiotomy or perineal tear.    A fever above 100.4 F (38 C) with or without chills when placing a thermometer under your tongue.    You soak a sanitary pad with blood within 1 hour, or you see blood clots larger than a golf ball.    Bleeding that lasts more than 6 weeks.    Vaginal discharge that smells bad.    Severe pain, cramping or tenderness in your lower belly area.    A need to urinate more frequently (use the toilet more often), more urgently (use the toilet very quickly), or it burns when you urinate.    Nausea and vomiting.    Redness, swelling or pain around a vein in your leg.    Problems breastfeeding or a red or painful area on your breast.    Chest pain and cough or are gasping for air.    Problems coping with sadness, anxiety, or depression.  If you have any concerns about hurting yourself or the baby, call your provider immediately.     You have questions  or concerns after you return home.     Keep your hands clean:  Always wash your hands before touching your perineal area and stitches.  This helps reduce your risk of infection.  If your hands aren't dirty, you may use an alcohol hand-rub to clean your hands. Keep your nails clean and short.

## 2018-07-30 NOTE — DISCHARGE SUMMARY
[facility] Discharge Summary    Mila Oden MRN# [id number]   Age: 22 year old YOB: 1995     Date of Admission:  2018  Date of Discharge::  2018  Admitting Physician:  Kade Hernández MD  Discharge Physician:  Candice Rivero MD     Home clinic: [facility]          Admission Diagnoses:   Abdominal cramping  Amniotic fluid leaking          Discharge Diagnosis:   Normal spontaneous vaginal delivery  Intrauterine pregnancy at 38 1/7 weeks gestation  Lactation          Procedures:   Procedure(s): No additional procedures performed              Medications Prior to Admission:     Prescriptions Prior to Admission   Medication Sig Dispense Refill Last Dose     Prenatal Vit-Fe Fumarate-FA (PRENATAL MULTIVITAMIN PLUS IRON) 27-0.8 MG TABS per tablet Take 1 tablet by mouth daily   2018 at 0900     Misc. Devices (BREAST PUMP) MISC Use every 3 hours as indicated (Patient not taking: Reported on 2018) 1 each 0 Unknown at Unknown time             Discharge Medications:     Current Discharge Medication List      START taking these medications    Details   acetaminophen (TYLENOL) 325 MG tablet Take 2 tablets (650 mg) by mouth every 4 hours as needed for mild pain or fever (greater than or equal to 38  C /100.4  F (oral) or 38.5  C/ 101.4  F (core).)    Associated Diagnoses:  (normal spontaneous vaginal delivery)      ibuprofen (ADVIL/MOTRIN) 200 MG tablet Take 3-4 tablets (600-800 mg) by mouth every 6 hours as needed for other (cramping)    Associated Diagnoses:  (normal spontaneous vaginal delivery)      lanolin ointment Use as needed for dry, cracked or sore nipples. No need to wash off.  Qty: 40 g, Refills: 1    Associated Diagnoses: Postpartum care and examination of lactating mother      senna-docusate (SENOKOT-S;PERICOLACE) 8.6-50 MG per tablet Take 1 tablet by mouth 2 times daily as needed for constipation  Qty: 60 tablet, Refills: 0     Associated Diagnoses:  (normal spontaneous vaginal delivery)         CONTINUE these medications which have NOT CHANGED    Details   Prenatal Vit-Fe Fumarate-FA (PRENATAL MULTIVITAMIN PLUS IRON) 27-0.8 MG TABS per tablet Take 1 tablet by mouth daily      Misc. Devices (BREAST PUMP) MISC Use every 3 hours as indicated  Qty: 1 each, Refills: 0    Comments: 1 double electric breast pump and all supplies  Associated Diagnoses: Postpartum care and examination of lactating mother                   Consultations:   No consultations were requested during this admission          Brief History of Labor:     Patient was admitted with abdominal pain and was found to be in active labor.  She went on to deliver a healthy baby girl without complications.  Please see delivery summary for full details.              Hospital Course:     The patient's hospital course was unremarkable.  On discharge, her pain was well controlled. Vaginal bleeding is similar to peak menstrual flow.  Voiding without difficulty.  Ambulating well and tolerating a normal diet.  No fever.  Breastfeeding well.  Infant is stable.  She was discharged on post-partum day #2.    On the day of discharge her exam is as follows:    Vitals:    18 0900 18 1630 18 1921 18 0800   BP: 107/54 104/63 120/59 103/58   BP Location:   Right arm    Pulse: 76 74 76 75   Resp: 16 16 16 16   Temp: 96.8  F (36  C) 97  F (36.1  C) 98.1  F (36.7  C) 98  F (36.7  C)   TempSrc: Oral Oral Oral Oral   SpO2:          Vitals noted.  Patient alert, oriented, and in no acute distress. CV:  RRR without murmur. Respiratory:  Lungs clear to auscultation bilaterally. Abdomen:  Soft, tender with palpation of the uterine fundus which is firm and below the level of the umbilicus, nondistended with good bowel sounds and no masses or hepatosplenomegaly.  Extremities warm and dry without significant edema.             Discharge Instructions and Follow-Up:   Discharge diet:  Regular   Discharge activity: No sex for 6 week(s)   Discharge follow-up: Follow up with Dr. Rivero in 6 weeks   Wound care: Tub soak daily or bid as able           Discharge Disposition:   Discharged to home      Attestation:  I have reviewed today's vital signs, notes, medications, labs and imaging.    Candice Rivero MD

## 2018-08-02 NOTE — PROGRESS NOTES
"Mila J Raoradhaayaka  Gender: female  : 1995  [address] [facility] [address]  [phone] (home)   Medical Record: [id number]  Primary Care Provider: Candice Rivero       [facility]   ?   Discharge Phone Call: Key Words/Key Times     How are you and the baby? Pt states that both her and  are doing well.  Patient is no longer having to take anything for pain. Bleeding is less than what she has with a normal period and continues to get lighter.  How are feedings going? Pt is breastfeeding every 2 hours. [place] feeds on both sides-about 5 minutes per side. After feedings patient has been pumping. Getting 1-2 ounces per pumping session.  Patient then bottle feeds that to the .  Patient's nipples are sore. She is using a nipple shield.  Offered to have lactation consultant talk with or meet with them.  Patient states that at this time she is okay with the way things are going with feedings.  Voiding & Stooling? WNL  Any questions or concerns? Patient denies any questions or concerns at this time  Follow-up appointment?  has a follow up appointment in the clinic tomorrow with .   We want to provide excellent care here at [facility]. Do you have any feedback for us that would help us improve? \"everyone was great\"   "

## 2018-09-10 ENCOUNTER — PRENATAL OFFICE VISIT (OUTPATIENT)
Dept: FAMILY MEDICINE | Facility: CLINIC | Age: 23
End: 2018-09-10
Payer: COMMERCIAL

## 2018-09-10 VITALS
TEMPERATURE: 97.8 F | DIASTOLIC BLOOD PRESSURE: 58 MMHG | SYSTOLIC BLOOD PRESSURE: 102 MMHG | BODY MASS INDEX: 36.53 KG/M2 | WEIGHT: 247.4 LBS | OXYGEN SATURATION: 97 % | HEART RATE: 99 BPM

## 2018-09-10 DIAGNOSIS — Z30.011 INITIATION OF ORAL CONTRACEPTION: ICD-10-CM

## 2018-09-10 PROBLEM — R10.9 ABDOMINAL CRAMPING: Status: RESOLVED | Noted: 2018-07-27 | Resolved: 2018-09-10

## 2018-09-10 PROBLEM — O42.90 AMNIOTIC FLUID LEAKING: Status: RESOLVED | Noted: 2018-07-28 | Resolved: 2018-09-10

## 2018-09-10 PROBLEM — Z34.03 ENCOUNTER FOR SUPERVISION OF NORMAL FIRST PREGNANCY IN THIRD TRIMESTER: Status: RESOLVED | Noted: 2018-06-04 | Resolved: 2018-09-10

## 2018-09-10 PROCEDURE — 99207 ZZC POST PARTUM EXAM: CPT | Performed by: FAMILY MEDICINE

## 2018-09-10 RX ORDER — ACETAMINOPHEN AND CODEINE PHOSPHATE 120; 12 MG/5ML; MG/5ML
0.35 SOLUTION ORAL DAILY
Qty: 84 TABLET | Refills: 3 | Status: SHIPPED | OUTPATIENT
Start: 2018-09-10 | End: 2019-08-08

## 2018-09-10 ASSESSMENT — PAIN SCALES - GENERAL: PAINLEVEL: NO PAIN (0)

## 2018-09-10 NOTE — LETTER
[address]   [address]  Tel. [phone] / Fax [phone]    September 10, 2018        Mila MILLARD Cecille  [address] [address] [place] [id number]          To whom it may concern,     Mila may return to work as of Tuesday 9/11/18 without restrictions.        Sincerely,        Candice Rivero M.D.

## 2018-09-10 NOTE — PROGRESS NOTES
Mila is here for a 6-week postpartum checkup.    She had a  of a viable girl, weight 6 pounds 13 oz., with no complications. Date of delivery was 18. Since delivery, she has been breast feeding.  She has no signs of infection, bleeding or other complications.  She is not pregnant.  Not yet sexually active.  She bled for about 2 1/2 weeks, then stopped and now has a period for the past 5 days.  We discussed contraception options and she has chosen mini pill / progesterone only pill for now, considering Mirena. Wants to wait about 3 years or more to have another baby.     Post partum tubal: No  History of Gestational Diabetes? No  Type of Delivery:  Vaginal  Feeding Method:  Breast and formula  If initiated breast feeding and stopped, how long did you breast feed?:  n/a    REVIEW OF SYSTEMS:  Ears/Nose/Throat: negative  Respiratory: negative  Cardiovascular: negative  Gastrointestinal: negative  Genitourinary: negative  Musculoskeletal: negative    Neurologic: negative   Skin: negative   Endocrine:  negative  Psych:negative for postpartum depression      Past Medical History:   Diagnosis Date     Otitis media        Past Surgical History:   Procedure Laterality Date     ENT SURGERY      L tympanoplasty x 3       Family History   Problem Relation Age of Onset     Arrhythmia Mother      a.fib     Crohn Disease Mother      Other - See Comments Father      doesn't know dad or dad's side of family history     Cerebrovascular Disease Maternal Grandmother      Alzheimer Disease Maternal Grandfather            EXAM:  /58  Pulse 99  Temp 97.8  F (36.6  C) (Temporal)  Wt 247 lb 6.4 oz (112.2 kg)  LMP 2018 (Exact Date)  SpO2 97%  Breastfeeding? Yes  BMI 36.53 kg/m2  HEENT: grossly normal.  NECK: no lymphadenopathy or thyroidomegaly.  LUNGS: CTA X 2, no rales or crackles.  BACK: No spinal or CVA tenderness.  HEART: RRR without murmurs clicks or gallops.  ABDOMEN: soft, non tender, good bowel sounds,  without masses rebound, guarding or tenderness.  INCISION: n/a  PELVIC: exam deferred as no concerns and not due for pap.   EXTREMITIES:  warm to touch, good pulses, no ankle edema or calf tenderness.  NEUROLOGIC: grossly normal.    ASSESSMENT:   6-week postpartum exam after .    ICD-10-CM    1. Routine postpartum follow-up Z39.2    2. Initiation of oral contraception Z30.011 norethindrone (MICRONOR) 0.35 MG per tablet      PLAN:    All Contraception methods discussed, will start her on Micronor and will notify me if she desires IUD or if/when she stops nursing if she would like to switch to combination OCs.    Discussed calcium intake, vitamins and supplements, recommend continue PNV.   Exercise encouraged  Follow up in 1 year.    Candice Rivero MD

## 2018-09-10 NOTE — MR AVS SNAPSHOT
After Visit Summary   9/10/2018    Mila Oden    MRN: [id number]           Patient Information     Date Of Birth          1995        Visit Information        Provider Department      9/10/2018 4:15 PM Candice Rivero MD [facility]        Today's Diagnoses     Routine postpartum follow-up    -  1    Initiation of oral contraception           Follow-ups after your visit        Who to contact     If you have questions or need follow up information about today's clinic visit or your schedule please contact [facility] directly at [phone].  Normal or non-critical lab and imaging results will be communicated to you by MyChart, letter or phone within 4 business days after the clinic has received the results. If you do not hear from us within 7 days, please contact the clinic through MyChart or phone. If you have a critical or abnormal lab result, we will notify you by phone as soon as possible.  Submit refill requests through [employer] or call your pharmacy and they will forward the refill request to us. Please allow 3 business days for your refill to be completed.          Additional Information About Your Visit        Care EveryWhere ID     This is your Care EveryWhere ID. This could be used by other organizations to access your [place] medical records  [id number]        Your Vitals Were     Pulse Temperature Last Period Pulse Oximetry Breastfeeding? BMI (Body Mass Index)    99 97.8  F (36.6  C) (Temporal) 09/05/2018 (Exact Date) 97% Yes 36.53 kg/m2       Blood Pressure from Last 3 Encounters:   09/10/18 102/58   07/30/18 103/58   07/23/18 120/70    Weight from Last 3 Encounters:   09/10/18 247 lb 6.4 oz (112.2 kg)   07/23/18 259 lb (117.5 kg)   07/16/18 260 lb 6.4 oz (118.1 kg)              Today, you had the following     No orders found for display         Today's Medication Changes          These changes are accurate as of 9/10/18  4:54 PM.   If you have any questions, ask your nurse or doctor.               Start taking these medicines.        Dose/Directions    norethindrone 0.35 MG per tablet   Commonly known as:  MICRONOR   Used for:  Initiation of oral contraception   Started by:  Candice Rivero MD        Dose:  0.35 mg   Take 1 tablet (0.35 mg) by mouth daily   Quantity:  84 tablet   Refills:  3            Where to get your medicines      These medications were sent to [facility] [address]   [address] [facility] [address]     Phone:  [phone]     norethindrone 0.35 MG per tablet                Primary Care Provider Office Phone # Fax #    Candice Rivero [phone]        [address] WIL[address]  [facility] [place] [address]        Equal Access to Services     [facility]: Hadii jessika mix hadasho Soomaali, waaxda luqadaha, qaybta kaalmada adeegyada, minna morillo . So [facility] [phone].    ATENCIÓN: Si habla español, tiene a perez disposición servicios gratuitos de asistencia lingüística. [facility] [phone].    We comply with applicable federal civil rights laws and [place] laws. We do not discriminate on the basis of race, color, national origin, age, disability, sex, sexual orientation, or gender identity.            Thank you!     Thank you for choosing [facility]  for your care. Our goal is always to provide you with excellent care. Hearing back from our patients is one way we can continue to improve our services. Please take a few minutes to complete the written survey that you may receive in the mail after your visit with us. Thank you!             Your Updated Medication List - Protect others around you: Learn how to safely use, store and throw away your medicines at www.disposemymeds.org.          This list is accurate as of 9/10/18  4:54 PM.  Always use your most recent med list.                   Brand Name Dispense  Instructions for use Diagnosis    breast pump Misc     1 each    Use every 3 hours as indicated    Postpartum care and examination of lactating mother       norethindrone 0.35 MG per tablet    MICRONOR    84 tablet    Take 1 tablet (0.35 mg) by mouth daily    Initiation of oral contraception

## 2019-07-22 ENCOUNTER — OFFICE VISIT (OUTPATIENT)
Dept: FAMILY MEDICINE | Facility: OTHER | Age: 24
End: 2019-07-22
Payer: COMMERCIAL

## 2019-07-22 VITALS
HEIGHT: 69 IN | SYSTOLIC BLOOD PRESSURE: 110 MMHG | DIASTOLIC BLOOD PRESSURE: 60 MMHG | RESPIRATION RATE: 20 BRPM | TEMPERATURE: 98.5 F | BODY MASS INDEX: 37.01 KG/M2 | WEIGHT: 249.9 LBS | HEART RATE: 80 BPM

## 2019-07-22 DIAGNOSIS — F41.9 ANXIETY: ICD-10-CM

## 2019-07-22 DIAGNOSIS — R45.89 DEPRESSED MOOD: Primary | ICD-10-CM

## 2019-07-22 PROCEDURE — 99213 OFFICE O/P EST LOW 20 MIN: CPT | Performed by: PHYSICIAN ASSISTANT

## 2019-07-22 RX ORDER — SERTRALINE HYDROCHLORIDE 25 MG/1
TABLET, FILM COATED ORAL
Qty: 45 TABLET | Refills: 0 | Status: SHIPPED | OUTPATIENT
Start: 2019-07-22 | End: 2019-08-19 | Stop reason: DRUGHIGH

## 2019-07-22 ASSESSMENT — PAIN SCALES - GENERAL: PAINLEVEL: NO PAIN (0)

## 2019-07-22 ASSESSMENT — PATIENT HEALTH QUESTIONNAIRE - PHQ9: SUM OF ALL RESPONSES TO PHQ QUESTIONS 1-9: 10

## 2019-07-22 ASSESSMENT — MIFFLIN-ST. JEOR: SCORE: 1951.88

## 2019-07-22 NOTE — LETTER
My Depression Action Plan  Name: Mila Oden   Date of Birth 1995  Date: 7/22/2019    My doctor: Candice Rivero   My clinic: [place] [address] [address] [facility] [id number] [phone]          GREEN    ZONE   Good Control    What it looks like:     Things are going generally well. You have normal up s and down s. You may even feel depressed from time to time, but bad moods usually last less than a day.   What you need to do:  1. Continue to care for yourself (see self care plan)  2. Check your depression survival kit and update it as needed  3. Follow your physician s recommendations including any medication.  4. Do not stop taking medication unless you consult with your physician first.           YELLOW         ZONE Getting Worse    What it looks like:     Depression is starting to interfere with your life.     It may be hard to get out of bed; you may be starting to isolate yourself from others.    Symptoms of depression are starting to last most all day and this has happened for several days.     You may have suicidal thoughts but they are not constant.   What you need to do:     1. Call your care team, your response to treatment will improve if you keep your care team informed of your progress. Yellow periods are signs an adjustment may need to be made.     2. Continue your self-care, even if you have to fake it!    3. Talk to someone in your support network    4. Open up your depression survival kit           RED    ZONE Medical Alert - Get Help    What it looks like:     Depression is seriously interfering with your life.     You may experience these or other symptoms: You can t get out of bed most days, can t work or engage in other necessary activities, you have trouble taking care of basic hygiene, or basic responsibilities, thoughts of suicide or death that will not go away, self-injurious behavior.     What you need to do:  1. Call your care team  and request a same-day appointment. If they are not available (weekends or after hours) call your local crisis line, emergency room or 911.            Depression Self Care Plan / Survival Kit    Self-Care for Depression  Here s the deal. Your body and mind are really not as separate as most people think.  What you do and think affects how you feel and how you feel influences what you do and think. This means if you do things that people who feel good do, it will help you feel better.  Sometimes this is all it takes.  There is also a place for medication and therapy depending on how severe your depression is, so be sure to consult with your medical provider and/ or Behavioral Health Consultant if your symptoms are worsening or not improving.     In order to better manage my stress, I will:    Exercise  Get some form of exercise, every day. This will help reduce pain and release endorphins, the  feel good  chemicals in your brain. This is almost as good as taking antidepressants!  This is not the same as joining a gym and then never going! (they count on that by the way ) It can be as simple as just going for a walk or doing some gardening, anything that will get you moving.      Hygiene   Maintain good hygiene (Get out of bed in the morning, Make your bed, Brush your teeth, Take a shower, and Get dressed like you were going to work, even if you are unemployed).  If your clothes don't fit try to get ones that do.    Diet  I will strive to eat foods that are good for me, drink plenty of water, and avoid excessive sugar, caffeine, alcohol, and other mood-altering substances.  Some foods that are helpful in depression are: complex carbohydrates, B vitamins, flaxseed, fish or fish oil, fresh fruits and vegetables.    Psychotherapy  I agree to participate in Individual Therapy (if recommended).    Medication  If prescribed medications, I agree to take them.  Missing doses can result in serious side effects.  I understand  that drinking alcohol, or other illicit drug use, may cause potential side effects.  I will not stop my medication abruptly without first discussing it with my provider.    Staying Connected With Others  I will stay in touch with my friends, family members, and my primary care provider/team.    Use your imagination  Be creative.  We all have a creative side; it doesn t matter if it s oil painting, sand castles, or mud pies! This will also kick up the endorphins.    Witness Beauty  (AKA stop and smell the roses) Take a look outside, even in mid-winter. Notice colors, textures. Watch the squirrels and birds.     Service to others  Be of service to others.  There is always someone else in need.  By helping others we can  get out of ourselves  and remember the really important things.  This also provides opportunities for practicing all the other parts of the program.    Humor  Laugh and be silly!  Adjust your TV habits for less news and crime-drama and more comedy.    Control your stress  Try breathing deep, massage therapy, biofeedback, and meditation. Find time to relax each day.     My support system    Clinic Contact:  Phone number:    Contact 1:  Phone number:    Contact 2:  Phone number:    [employer]/:  Phone number:    Therapist:  Phone number:    Local crisis center:    Phone number:    Other community support:  Phone number:

## 2019-07-22 NOTE — PATIENT INSTRUCTIONS
Patient Education     Depression: Tips to Help Yourself    As your healthcare providers help treat your depression, you can also help yourself. Keep in mind that your illness affects you emotionally, physically, mentally, and socially. So full recovery will take time. Take care of your body and your soul, and be patient with yourself as you get better.  Self-care    Educate yourself. Read about treatment and medicine options. If you have the energy, attend local conferences or support groups. Keep a list of useful websites and helpful books and use them as needed. This illness is not your fault. Don t blame yourself for your depression.    Manage early symptoms. If you notice symptoms returning, experience triggers, or identify other factors that may lead to a depressive episode, get help as soon as possible. Ask trusted friends and family to monitor your behavior and let you know if they see anything of concern.    Work with your provider. Find a provider you can trust. Communicate honestly with that person and share information on your treatment for depression and your reaction to medicines.    Be prepared for a crisis. Know what to do if you experience a crisis. Keep the phone number of a crisis hotline and know the location of your community's urgent care centers and the closest emergency department.    Hold off on big decisions. Depression can cloud your judgment. So wait until you feel better before making major life decisions, such as changing jobs, moving, or getting  or .    Be patient. Recovering from depression is a process. Don t be discouraged if it takes some time to feel better.    Keep it simple. Depression saps your energy and concentration. So you won t be able to do all the things you used to do. Set small goals and do what you can.    Be with others. Don t isolate yourself--you ll only feel worse. Try to be with other people. And take part in fun activities when you can. Go to a  movie, ballgame, [employer] service, or social event. Talk openly with people you can trust. And accept help when it s offered.  Take care of your body  People with depression often lose the desire to take care of themselves. That only makes their problems worse. During treatment and afterward, make a point to:    Exercise. It s a great way to take care of your body. And studies have shown that exercise helps fight depression.    Avoid drugs and alcohol. These may ease the pain in the short term. But they ll only make your problems worse in the long run.    Get relief from stress. Ask your healthcare provider for relaxation exercises and techniques to help relieve stress.    Eat right. A balanced and healthy diet helps keep your body healthy.  Date Last Reviewed: 1/1/2017 2000-2018 [employer]. [address] [place] [address], [place] [address]. All rights reserved. This information is not intended as a substitute for professional medical care. Always follow your healthcare professional's instructions.

## 2019-07-22 NOTE — NURSING NOTE
Health Maintenance Due   Topic Date Due     PREVENTIVE CARE VISIT  09/17/2009     HPV IMMUNIZATION (1 - Female 3-dose series) 09/25/2009     PHQ-2  01/01/2019     CHLAMYDIA SCREENING  02/05/2019   Roxanne DOMINGUEZ LPN

## 2019-07-22 NOTE — PROGRESS NOTES
"Subjective     Mila Oden is a 23 year old female who presents to clinic today for the following health issues:    HPI   Abnormal Mood Symptoms  Onset: 8 months    Description:   Depression: YES  Anxiety: YES    Accompanying Signs & Symptoms:  Still participating in activities that you used to enjoy: no  Fatigue: YES  Irritability: YES  Difficulty concentrating: no  Changes in appetite: YES  Problems with sleep: no  Heart racing/beating fast : YES  Thoughts of hurting yourself or others: none    History:   Recent stress: YES  Prior depression hospitalization: None  Family history of depression: YES  Family history of anxiety: YES    Precipitating factors:   Alcohol/drug use: no    Alleviating factors:  nothing    Therapies Tried and outcome: None      Patient is a 23 year old female who presents today with complaint of depressed mood for past year. She said that she noticed her mood was down following the birth of her daughter 1 year ago. She did not seek medical care at that time and said that she thought it would get better. She is in today as she has felt that her motivation and energy have decreased quite a bit, she is irritable, snacking frequently, does not find pleasure in riding horses or other activities that she would otherwise enjoy. Patient works at [employer] as a . Denies thoughts of wanting to hurt herself or others. Has never been treated for depressed mood in the past. Does experience some anxiety while at work and feels that it does affect her ability to be productive.     Reviewed and updated as needed this visit by Provider         Review of Systems   ROS COMP: Constitutional, HEENT, cardiovascular, pulmonary, gi and gu systems are negative, except as otherwise noted.      Objective    /60 (BP Location: Left arm, Patient Position: Chair, Cuff Size: Adult Large)   Pulse 80   Temp 98.5  F (36.9  C) (Oral)   Resp 20   Ht 1.759 m (5' 9.25\")   Wt 113.4 kg (249 lb 14.4 oz)  "  BMI 36.64 kg/m    Body mass index is 36.64 kg/m .  Physical Exam   GENERAL: healthy, alert and no distress  RESP: lungs clear to auscultation - no rales, rhonchi or wheezes  CV: regular rate and rhythm, normal S1 S2, no S3 or S4, no murmur, click or rub, no peripheral edema and peripheral pulses strong  MS: no gross musculoskeletal defects noted, no edema  PSYCH: mentation appears normal, affect normal/bright    Diagnostic Test Results:  Labs reviewed in Epic        Assessment & Plan     1. Depressed mood  Patient will start sertraline today and will follow up in 4 weeks for evaluation on benefit. Discussed realistic timeline for benefit as well as possible adverse effects. Patient is interested in counseling and will schedule with one of the counselors at [facility].   - [facility]  - sertraline (ZOLOFT) 25 MG tablet; Take 1 tab (25mg) daily for 2 weeks, then increase to 2 tabs (50mg) daily  Dispense: 45 tablet; Refill: 0    2. Anxiety  See above.       Follow up with clinic for annual checkup or sooner if conditions change, worsen or fail to improve as expected.    Damien Ivey PA-C  [facility]

## 2019-08-08 DIAGNOSIS — Z30.011 INITIATION OF ORAL CONTRACEPTION: ICD-10-CM

## 2019-08-08 RX ORDER — NORETHINDRONE 0.35 MG
KIT ORAL
Qty: 84 TABLET | Refills: 0 | Status: SHIPPED | OUTPATIENT
Start: 2019-08-08 | End: 2019-11-06

## 2019-08-08 NOTE — TELEPHONE ENCOUNTER
"Requested Prescriptions   Pending Prescriptions Disp Refills     DEBLITANE 0.35 MG tablet [Pharmacy Med Name: DEBLITANE 0.35MG TABS] 84 tablet 3     Sig: TAKE ONE TABLET BY MOUTH EVERY DAY   Last Written Prescription Date:  9/10/2018  Last Fill Quantity: 84,  # refills: 3   Last office visit: 7/22/2019 with prescribing provider:     Future Office Visit:   Next 5 appointments (look out 90 days)    Aug 19, 2019 11:00 AM CDT  Office Visit with Damien Ivey PA-C  [facility] ([facility] [address] [facility] [address]  [phone]             Contraceptives Protocol Failed - 8/8/2019  9:18 AM        Failed - Recent (12 mo) or future (30 days) visit within the authorizing provider's specialty     Patient had office visit in the last 12 months or has a visit in the next 30 days with authorizing provider or within the authorizing provider's specialty.  See \"Patient Info\" tab in inbasket, or \"Choose Columns\" in Meds & Orders section of the refill encounter.              Passed - Patient is not a current smoker if age is 35 or older        Passed - Medication is active on med list        Passed - No active pregnancy on record        Passed - No positive pregnancy test in past 12 months        Prescription approved per [facility] Refill Protocol.  Carin Angel RN    "

## 2019-08-19 ENCOUNTER — OFFICE VISIT (OUTPATIENT)
Dept: FAMILY MEDICINE | Facility: OTHER | Age: 24
End: 2019-08-19
Payer: COMMERCIAL

## 2019-08-19 VITALS
WEIGHT: 243.2 LBS | SYSTOLIC BLOOD PRESSURE: 112 MMHG | TEMPERATURE: 97.2 F | DIASTOLIC BLOOD PRESSURE: 60 MMHG | HEART RATE: 76 BPM | BODY MASS INDEX: 35.66 KG/M2 | RESPIRATION RATE: 20 BRPM

## 2019-08-19 DIAGNOSIS — R45.89 DEPRESSED MOOD: Primary | ICD-10-CM

## 2019-08-19 PROCEDURE — 99213 OFFICE O/P EST LOW 20 MIN: CPT | Performed by: PHYSICIAN ASSISTANT

## 2019-08-19 RX ORDER — SERTRALINE HYDROCHLORIDE 100 MG/1
100 TABLET, FILM COATED ORAL DAILY
Qty: 30 TABLET | Refills: 3 | Status: SHIPPED | OUTPATIENT
Start: 2019-08-19

## 2019-08-19 ASSESSMENT — PAIN SCALES - GENERAL: PAINLEVEL: NO PAIN (0)

## 2019-08-19 ASSESSMENT — PATIENT HEALTH QUESTIONNAIRE - PHQ9: SUM OF ALL RESPONSES TO PHQ QUESTIONS 1-9: 8

## 2019-08-19 NOTE — NURSING NOTE
Health Maintenance Due   Topic Date Due     PREVENTIVE CARE VISIT  09/17/2009     HPV IMMUNIZATION (1 - Female 3-dose series) 09/25/2009     CHLAMYDIA SCREENING  02/05/2019     Roxanne DOMINGUEZ LPN

## 2019-08-19 NOTE — PROGRESS NOTES
Subjective     Mila Oden is a 23 year old female who presents to clinic today for the following health issues:    HPI   Depression Followup    How are you doing with your depression since your last visit? Improved     Are you having other symptoms that might be associated with depression? Yes:  feeling down, not enough energy    Have you had a significant life event?  Job Concerns     Are you feeling anxious or having panic attacks?   Yes:  anxiety    Do you have any concerns with your use of alcohol or other drugs? No    Social History     Tobacco Use     Smoking status: Passive Smoke Exposure - Never Smoker     Smokeless tobacco: Never Used     Tobacco comment: around smoke passively   Substance Use Topics     Alcohol use: No     Alcohol/week: 0.0 oz     Drug use: No     PHQ 7/22/2019 8/19/2019   PHQ-9 Total Score 10 8   Q9: Thoughts of better off dead/self-harm past 2 weeks Not at all Not at all     No flowsheet data found.  No flowsheet data found.      Suicide Assessment Five-step Evaluation and Treatment (SAFE-T)      How many servings of fruits and vegetables do you eat daily?  2-3    On average, how many sweetened beverages do you drink each day (soda, juice, sweet tea, etc)?   1    How many days per week do you miss taking your medication? 0        Patient is a 23 year old female who presents today for follow up of depression. She was last seen 07/22 at which time she had reported 1 year of depression following birth of daughter. She was experiencing several symptoms including irregular appetite and sleep, anhedonia specifically for riding horses, irritability and low energy. Her daughter is with her today and she states that she feels an attachment to the child and enjoys being a mother. Since starting the medication the patient feels that her sleep and appetite have become much more regular and she feels that she has more energy to do the things she enjoys. She continues to have some irritability,  but this is mostly directed towards her co-workers whom she reports are making inappropriate comments about being depressed. She plans to transition to a new job with [employer] and was hoping to get out of work for the next two weeks. I explained that as she is reporting improved mood, no thoughts of wanting to hurt self or others and has no physical disabilities I cannot justify taking her out of work.           Reviewed and updated as needed this visit by Provider         Review of Systems   ROS COMP: Constitutional, HEENT, cardiovascular, pulmonary, gi and gu systems are negative, except as otherwise noted.      Objective    /60 (BP Location: Left arm, Patient Position: Chair, Cuff Size: Adult Large)   Pulse 76   Temp 97.2  F (36.2  C) (Oral)   Resp 20   Wt 110.3 kg (243 lb 3.2 oz)   BMI 35.66 kg/m    Body mass index is 35.66 kg/m .  Physical Exam   GENERAL: healthy, alert and no distress  RESP: lungs clear to auscultation - no rales, rhonchi or wheezes  CV: regular rate and rhythm, normal S1 S2, no S3 or S4, no murmur, click or rub, no peripheral edema and peripheral pulses strong  MS: no gross musculoskeletal defects noted, no edema  NEURO: Normal strength and tone, mentation intact and speech normal  PSYCH: mentation appears normal, affect normal/bright    Diagnostic Test Results:  Labs reviewed in Epic        Assessment & Plan     1. Depressed mood  Patient will increase dose of zoloft to 100mg daily and will notify if not tolerating. Denies thoughts of wanting to hurt self or others.   - sertraline (ZOLOFT) 100 MG tablet; Take 1 tablet (100 mg) by mouth daily  Dispense: 30 tablet; Refill: 3        Damien Ivey PA-C  [facility]

## 2019-11-06 DIAGNOSIS — Z30.011 INITIATION OF ORAL CONTRACEPTION: ICD-10-CM

## 2019-11-06 RX ORDER — NORETHINDRONE 0.35 MG
KIT ORAL
Qty: 84 TABLET | Refills: 3 | Status: SHIPPED | OUTPATIENT
Start: 2019-11-06

## 2019-11-06 NOTE — TELEPHONE ENCOUNTER
"Deblitane  Last Written Prescription Date:  08/08/2019  Last Fill Quantity: 84,  # refills: 0   Last office visit: 8/19/2019 with prescribing provider:     Future Office Visit:        Requested Prescriptions   Pending Prescriptions Disp Refills     DEBLITANE 0.35 MG tablet [Pharmacy Med Name: DEBLITANE 0.35MG TABS] 84 tablet 0     Sig: TAKE ONE TABLET BY MOUTH EVERY DAY       Contraceptives Protocol Failed - 11/6/2019  9:22 AM        Failed - Recent (12 mo) or future (30 days) visit within the authorizing provider's specialty     Patient has had an office visit with the authorizing provider or a provider within the authorizing providers department within the previous 12 mos or has a future within next 30 days. See \"Patient Info\" tab in inbasket, or \"Choose Columns\" in Meds & Orders section of the refill encounter.              Passed - Patient is not a current smoker if age is 35 or older        Passed - Medication is active on med list        Passed - No active pregnancy on record        Passed - No positive pregnancy test in past 12 months          "

## 2020-01-02 ENCOUNTER — OFFICE VISIT (OUTPATIENT)
Dept: FAMILY MEDICINE | Facility: OTHER | Age: 25
End: 2020-01-02

## 2020-01-02 VITALS
SYSTOLIC BLOOD PRESSURE: 116 MMHG | WEIGHT: 265.2 LBS | TEMPERATURE: 98.7 F | RESPIRATION RATE: 20 BRPM | OXYGEN SATURATION: 96 % | HEART RATE: 80 BPM | BODY MASS INDEX: 39.28 KG/M2 | HEIGHT: 69 IN | DIASTOLIC BLOOD PRESSURE: 70 MMHG

## 2020-01-02 DIAGNOSIS — J01.00 ACUTE NON-RECURRENT MAXILLARY SINUSITIS: Primary | ICD-10-CM

## 2020-01-02 PROCEDURE — 99213 OFFICE O/P EST LOW 20 MIN: CPT | Performed by: PHYSICIAN ASSISTANT

## 2020-01-02 RX ORDER — CEFDINIR 300 MG/1
300 CAPSULE ORAL 2 TIMES DAILY
Qty: 20 CAPSULE | Refills: 0 | Status: SHIPPED | OUTPATIENT
Start: 2020-01-02 | End: 2020-01-12

## 2020-01-02 ASSESSMENT — MIFFLIN-ST. JEOR: SCORE: 2012.32

## 2020-01-02 ASSESSMENT — PAIN SCALES - GENERAL: PAINLEVEL: NO PAIN (0)

## 2020-01-02 NOTE — PROGRESS NOTES
"Subjective     Mila Oden is a 24 year old female who presents to clinic today for the following health issues:    HPI   Acute Illness   Acute illness concerns: cold symptoms  Onset: 1 week    Fever: no    Chills/Sweats: no    Headache (location?): YES    Sinus Pressure:YES    Conjunctivitis:  YES: both    Ear Pain: YES: left    Rhinorrhea: YES    Congestion: YES    Sore Throat: no     Cough: YES-productive of white sputum    Wheeze: no    Decreased Appetite: no    Nausea: no    Vomiting: no    Diarrhea:  no    Dysuria/Freq.: no    Fatigue/Achiness: no    Sick/Strep Exposure: YES     Therapies Tried and outcome: Tylenol, slight relief    Patient is a 24 year old female who presents with complaint of worsening URI symptoms. She states that the most bothersome symptom is the pressure behind both eyes and in her left ear.     Reviewed and updated as needed this visit by Provider         Review of Systems   ROS COMP: Constitutional, HEENT, cardiovascular, pulmonary, gi and gu systems are negative, except as otherwise noted.      Objective    /70 (BP Location: Left arm, Patient Position: Chair, Cuff Size: Adult Large)   Pulse 80   Temp 98.7  F (37.1  C) (Oral)   Resp 20   Ht 1.753 m (5' 9\")   Wt 120.3 kg (265 lb 3.2 oz)   SpO2 96%   BMI 39.16 kg/m    Body mass index is 39.16 kg/m .  Physical Exam   GENERAL: healthy, alert and no distress  EYES: Eyes grossly normal to inspection, PERRL and conjunctivae and sclerae normal  HENT: ear canals and TM's dull, left mildly erythematous, nose and mouth without ulcers or lesions, tenderness to percussion over the maxillary sinuses  NECK: no adenopathy, no asymmetry, masses, or scars and thyroid normal to palpation  RESP: lungs clear to auscultation - no rales, rhonchi or wheezes  CV: regular rate and rhythm, normal S1 S2, no S3 or S4, no murmur, click or rub, no peripheral edema and peripheral pulses strong    Diagnostic Test Results:  Labs reviewed in Epic    "     Assessment & Plan       ICD-10-CM    1. Acute non-recurrent maxillary sinusitis J01.00 cefdinir (OMNICEF) 300 MG capsule        Patient will start antibiotic and take until gone. Reviewed possible adverse effects. Educated patients on home therapies to help reduce symptoms, copy of educational information sent home with the patient.     Return in about 3 months (around 4/2/2020) for Medication Recheck.    Damien Ivey PA-C  [facility]

## 2020-01-02 NOTE — PATIENT INSTRUCTIONS
Patient Education     Sinusitis (Antibiotic Treatment)    The sinuses are air-filled spaces within the bones of the face. They connect to the inside of the nose. Sinusitis is an inflammation of the tissue that lines the sinuses. Sinusitis can occur during a cold. It can also happen due to allergies to pollens and other particles in the air. Sinusitis can cause symptoms of sinus congestion and a feeling of fullness. A sinus infection causes fever, headache, and facial pain. There is often green or yellow fluid draining from the nose or into the back of the throat (post-nasal drip). You have been given antibiotics to treat this condition.  Home care    Take the full course of antibiotics as instructed. Do not stop taking them, even when you feel better.    Drink plenty of water, hot tea, and other liquids. This may help thin nasal mucus. It also may help your sinuses drain fluids.    Heat may help soothe painful areas of your face. Use a towel soaked in hot water. Or,  the shower and direct the warm spray onto your face. Using a vaporizer along with a menthol rub at night may also help soothe symptoms.     An expectorant with guaifenesin may help thin nasal mucus and help your sinuses drain fluids.    You can use an over-the-counter decongestant, unless a similar medicine was prescribed to you. Nasal sprays work the fastest. Use one that contains phenylephrine or oxymetazoline. First blow your nose gently. Then use the spray. Do not use these medicines more often than directed on the label. If you do, your symptoms may get worse. You may also take pills that contain pseudoephedrine. Don t use products that combine multiple medicines. This is because side effects may be increased. Read labels. You can also ask the pharmacist for help. (People with high blood pressure should not use decongestants. They can raise blood pressure.)    Over-the-counter antihistamines may help if allergies contributed to your  sinusitis.      Do not use nasal rinses or irrigation during an acute sinus infection, unless your healthcare provider tells you to. Rinsing may spread the infection to other areas in your sinuses.    Use acetaminophen or ibuprofen to control pain, unless another pain medicine was prescribed to you. If you have chronic liver or kidney disease or ever had a stomach ulcer, talk with your healthcare provider before using these medicines. (Aspirin should never be taken by anyone under age 18 who is ill with a fever. It may cause severe liver damage.)    Don't smoke. This can make symptoms worse.  Follow-up care  Follow up with your healthcare provider or our staff if you are not better in 1 week.  When to seek medical advice  Call your healthcare provider if any of these occur:    Facial pain or headache that gets worse    Stiff neck    Unusual drowsiness or confusion    Swelling of your forehead or eyelids    Vision problems, such as blurred or double vision    Fever of 100.4 F (38 C) or higher, or as directed by your healthcare provider    Seizure    Breathing problems    Symptoms don't go away in 10 days  Prevention  Here are steps you can take to help prevent an infection:    Keep good hand washing habits.    Don t have close contact with people who have sore throats, colds, or other upper respiratory infections.    Don t smoke, and stay away from secondhand smoke.    Stay up to date with of your vaccines.  Date Last Reviewed: 11/1/2017 2000-2018 [employer]. [address] [place] [address], [place] [address]. All rights reserved. This information is not intended as a substitute for professional medical care. Always follow your healthcare professional's instructions.

## 2020-01-02 NOTE — NURSING NOTE
Health Maintenance Due   Topic Date Due     HPV IMMUNIZATION (1 - Female 2-dose series) 09/25/2005     INFLUENZA VACCINE (1) 09/01/2019     PREVENTIVE CARE VISIT  09/10/2019     Roxanne DOMINGUEZ LPN

## 2020-03-11 ENCOUNTER — TELEPHONE (OUTPATIENT)
Dept: FAMILY MEDICINE | Facility: CLINIC | Age: 25
End: 2020-03-11

## 2020-03-11 NOTE — TELEPHONE ENCOUNTER
Patient is due for a PHQ-9.  Index start date:11/23/2019  Index end date:3/22/2020    Please call patient.

## 2022-01-09 ENCOUNTER — HOSPITAL ENCOUNTER (EMERGENCY)
Facility: CLINIC | Age: 27
Discharge: HOME | End: 2022-01-09
Attending: EMERGENCY MEDICINE | Admitting: EMERGENCY MEDICINE
Payer: COMMERCIAL

## 2022-01-09 VITALS
SYSTOLIC BLOOD PRESSURE: 139 MMHG | WEIGHT: 260 LBS | HEART RATE: 126 BPM | OXYGEN SATURATION: 97 % | TEMPERATURE: 99.9 F | DIASTOLIC BLOOD PRESSURE: 73 MMHG | BODY MASS INDEX: 38.4 KG/M2 | RESPIRATION RATE: 22 BRPM

## 2022-01-09 DIAGNOSIS — U07.1 INFECTION DUE TO 2019 NOVEL CORONAVIRUS: ICD-10-CM

## 2022-01-09 LAB
FLUAV RNA SPEC QL NAA+PROBE: NEGATIVE
FLUBV RNA RESP QL NAA+PROBE: NEGATIVE
SARS-COV-2 RNA RESP QL NAA+PROBE: POSITIVE

## 2022-01-09 PROCEDURE — 250N000013 HC RX MED GY IP 250 OP 250 PS 637: Performed by: EMERGENCY MEDICINE

## 2022-01-09 PROCEDURE — 96372 THER/PROPH/DIAG INJ SC/IM: CPT | Performed by: EMERGENCY MEDICINE

## 2022-01-09 PROCEDURE — 99284 EMERGENCY DEPT VISIT MOD MDM: CPT | Performed by: EMERGENCY MEDICINE

## 2022-01-09 PROCEDURE — 87636 SARSCOV2 & INF A&B AMP PRB: CPT | Performed by: EMERGENCY MEDICINE

## 2022-01-09 PROCEDURE — C9803 HOPD COVID-19 SPEC COLLECT: HCPCS | Performed by: EMERGENCY MEDICINE

## 2022-01-09 PROCEDURE — 250N000011 HC RX IP 250 OP 636: Performed by: EMERGENCY MEDICINE

## 2022-01-09 RX ORDER — KETOROLAC TROMETHAMINE 30 MG/ML
60 INJECTION, SOLUTION INTRAMUSCULAR; INTRAVENOUS ONCE
Status: COMPLETED | OUTPATIENT
Start: 2022-01-09 | End: 2022-01-09

## 2022-01-09 RX ORDER — ACETAMINOPHEN 500 MG
1000 TABLET ORAL ONCE
Status: COMPLETED | OUTPATIENT
Start: 2022-01-09 | End: 2022-01-09

## 2022-01-09 RX ADMIN — ACETAMINOPHEN 1000 MG: 500 TABLET, FILM COATED ORAL at 19:09

## 2022-01-09 RX ADMIN — KETOROLAC TROMETHAMINE 60 MG: 30 INJECTION, SOLUTION INTRAMUSCULAR at 19:10

## 2022-01-10 NOTE — ED PROVIDER NOTES
History     Chief Complaint   Patient presents with     Fever     Generalized Body Aches     HPI  History per patient    This is an otherwise healthy 26-year-old female presenting with fever and generalized body aches.  Patient started having symptoms about 2:00 in the morning.  She notes temperature up to 100.  She has had decreased oral intake, slight nausea.  She has a headache.  She feels some tightness/shortness of breath in her chest.  Her entire body feels sore.  She has not vomited.  No diarrhea.  Normal urination.  No skin changes or rash.  No sore throat.  She took 1 ibuprofen 6 hours ago.  She has not had COVID in the past and is not immunized for it.  No obvious sick contacts but she does work at [employer].  Patient is not diabetic, no history of asthma, she does not smoke.  She has an IUD in place.      Allergies:  Allergies   Allergen Reactions     Penicillins      sores in mouth       Problem List:    Patient Active Problem List    Diagnosis Date Noted     Postpartum care and examination of lactating mother 07/30/2018     Priority: Medium        Past Medical History:    Past Medical History:   Diagnosis Date     Otitis media        Past Surgical History:    Past Surgical History:   Procedure Laterality Date     ENT SURGERY      L tympanoplasty x 3       Family History:    Family History   Problem Relation Age of Onset     Arrhythmia Mother         a.fib     Crohn's Disease Mother      Other - See Comments Father         doesn't know dad or dad's side of family history     Cerebrovascular Disease Maternal Grandmother      Alzheimer Disease Maternal Grandfather        Social History:  Marital Status:  Single [1]  Social History     Tobacco Use     Smoking status: Passive Smoke Exposure - Never Smoker     Smokeless tobacco: Never Used     Tobacco comment: around smoke passively   Substance Use Topics     Alcohol use: No     Alcohol/week: 0.0 standard drinks     Drug use: No        Medications:     DEBLITANE 0.35 MG tablet  sertraline (ZOLOFT) 100 MG tablet          Review of Systems   All other ROS reviewed and are negative or non-contributory except as stated in HPI.     Physical Exam   BP: 139/73  Pulse: (!) 126  Temp: 99.9  F (37.7  C)  Resp: 22  Weight: 117.9 kg (260 lb)  SpO2: 99 %  Body mass index is 38.4 kg/m .     Physical Exam  Vitals and nursing note reviewed.   Constitutional:       Appearance: She is obese. She is ill-appearing.      Comments: Pleasant, flushed hearing Female sitting in the bed   HENT:      Head: Normocephalic and atraumatic.      Right Ear: Tympanic membrane and ear canal normal.      Left Ear: Tympanic membrane and ear canal normal.      Nose: Nose normal.      Mouth/Throat:      Mouth: Mucous membranes are moist.      Pharynx: Oropharynx is clear.   Eyes:      Extraocular Movements: Extraocular movements intact.      Pupils: Pupils are equal, round, and reactive to light.      Comments: Bilateral conjunctival injection   Cardiovascular:      Rate and Rhythm: Regular rhythm. Tachycardia present.      Pulses: Normal pulses.      Heart sounds: Murmur (Quiet flow murmur) heard.       Pulmonary:      Effort: Pulmonary effort is normal.      Breath sounds: Normal breath sounds.   Abdominal:      Palpations: Abdomen is soft.      Tenderness: There is no abdominal tenderness.   Musculoskeletal:         General: Normal range of motion.      Cervical back: Normal range of motion and neck supple.   Skin:     General: Skin is warm.      Comments: Flushed, sweaty   Neurological:      General: No focal deficit present.      Mental Status: She is alert and oriented to person, place, and time.   Psychiatric:         Mood and Affect: Mood normal.         Behavior: Behavior normal.         ED Course (with Medical Decision Making)    Pt seen and examined by me.  RN and EPIC notes reviewed.       Young female with acute onset of fever, body aches.  In the midst of the COVID pandemic, I highly  suspect that this patient has COVID.  Could possibly have influenza.  Less likely other viral versus bacterial infection.    She was given 1 dose of IM Toradol for her body aches.  Tylenol for her fever.  COVID/influenza swab sent.    COVID is positive.  Results discussed with the patient.  She is not having significant shortness of breath and has good oxygen saturations on room air.  Recommend drinking plenty of fluids, get lots of rest.  Quarantine recommendations discussed.  Get well loop ordered.  Continue to monitor symptoms and if she has any significant worsening, changes or concerns return at any time.         Procedures      Results for orders placed or performed during the hospital encounter of 01/09/22 (from the past 24 hour(s))   Symptomatic; Yes Influenza A/B & SARS-CoV2 (COVID-19) Virus PCR Multiplex Nasopharyngeal    Specimen: Nasopharyngeal; Swab   Result Value Ref Range    Influenza A PCR Negative Negative    Influenza B PCR Negative Negative    SARS CoV2 PCR Positive (A) Negative    Narrative    Testing was performed using the brian SARS-CoV-2 & Influenza A/B Assay on the brian Tish System. This test should be ordered for the detection of SARS-CoV-2 and influenza viruses in individuals who meet clinical and/or epidemiological criteria. Test performance is unknown in asymptomatic patients. This test is for in vitro diagnostic use under the FDA EUA for laboratories certified under CLIA to perform moderate and/or high complexity testing. This test has not been FDA cleared or approved. A negative result does not rule out the presence of PCR inhibitors in the specimen or target RNA in concentration below the limit of detection for the assay. If only one viral target is positive but coinfection with multiple targets is suspected, the sample should be re-tested with another FDA cleared, approved or authorized test, if coinfection would change clinical management. [facility] are  certified under the Clinical Laboratory Improvement Amendments of 1988 (CLIA-88) as  qualified to perform moderate and/or high complexity laboratory testing.       Medications   ketorolac (TORADOL) injection 60 mg (60 mg Intramuscular Given 1/9/22 1910)   acetaminophen (TYLENOL) tablet 1,000 mg (1,000 mg Oral Given 1/9/22 1909)       Assessments & Plan     I have reviewed the findings, diagnosis, plan and need for follow up with the patient.    Discharge Medication List as of 1/9/2022  8:50 PM          Final diagnoses:   Infection due to 2019 novel coronavirus     Disposition: Patient discharged home in stable condition.  Plan as above.  Return for concerns.      Note: Chart documentation done in part with Dragon Voice Recognition software. Although reviewed after completion, some word and grammatical errors may remain.     1/9/2022   [facility] EMERGENCY DEPT     Isabelle Perez MD  01/10/22 0106

## 2022-01-10 NOTE — DISCHARGE INSTRUCTIONS
"Drink lots of fluids and get plenty of rest.    Take ibuprofen or Aleve for pain and inflammation or fevers.  Okay to alternate with Tylenol.    Continue to monitor symptoms and if he has any significant worsening, changes or concerns return at any time.    I hope you start to feel much better quickly!!    Discharge Instructions for COVID-19 Patients  You have--or may have--COVID-19. Please follow the instructions listed below.   If you have a weakened immune system, discuss with your doctor any other actions you need to take.  How can I protect others?  If you have symptoms (fever, cough, body aches or trouble breathing):  Stay home and away from others (self-isolate) until:  Your other symptoms have resolved (gotten better). And   You've had no fever--and no medicine that reduces fever--for 1 full day (24 hours). And   At least 10 days have passed since your symptoms started. (You may need to wait 20 days. Follow the advice of your care team.)  If you don't show symptoms, but testing showed that you have COVID-19:  Stay home and away from others (self-isolate) until at least 10 days have passed since the date of your first positive COVID-19 test.  During this time  Stay in your own room, even for meals. Use your own bathroom if you can.  Stay away from others in your home. No hugging, kissing or shaking hands. No visitors.  Don't go to work, school or anywhere else.  Clean \"high touch\" surfaces often (doorknobs, counters, handles). Use household cleaning spray or wipes.  You'll find a full list of  on the EPA website: www.epa.gov/pesticide-registration/list-n-disinfectants-use-against-sars-cov-2.  Cover your mouth and nose with a mask or other face covering to avoid spreading germs.  Wash your hands and face often. Use soap and water.  Caregivers in these groups are at risk for severe illness due to COVID-19:  People 65 years and older  People who live in a nursing home or long-term care facility  People " with chronic disease (lung, heart, cancer, diabetes, kidney, liver, immunologic)  People who have a weakened immune system, including those who:  Are in cancer treatment  Take medicine that weakens the immune system, such as corticosteroids  Had a bone marrow or organ transplant  Have an immune deficiency  Have poorly controlled HIV or AIDS  Are obese (body mass index of 40 or higher)  Smoke regularly  Caregivers should wear gloves while washing dishes, handling laundry and cleaning bedrooms and bathrooms.  Use caution when washing and drying laundry: Don't shake dirty laundry and use the warmest water setting that you can.  For more tips on managing your health at home, go to www.cdc.gov/coronavirus/2019-ncov/downloads/10Things.pdf.  How can I take care of myself at home?  Get lots of rest. Drink extra fluids (unless a doctor has told you not to).  Take Tylenol (acetaminophen) for fever or pain. If you have liver or kidney problems, ask your family doctor if it's okay to take Tylenol.   Adults can take either:   650 mg (two 325 mg pills) every 4 to 6 hours, or   1,000 mg (two 500 mg pills) every 8 hours as needed.  Note: Don't take more than 3,000 mg in one day. Acetaminophen is found in many medicines (both prescribed and over-the-counter medicines). Read all labels to be sure you don't take too much.   For children, check the Tylenol bottle for the right dose. The dose is based on the child's age or weight.  If you have other health problems (like cancer, heart failure, an organ transplant or severe kidney disease): Call your specialty clinic if you don't feel better in the next 2 days.  Know when to call 911. Emergency warning signs include:  Trouble breathing or shortness of breath  Pain or pressure in the chest that doesn't go away  Feeling confused like you haven't felt before, or not being able to wake up  Bluish-colored lips or face  Your doctor may have prescribed a blood thinner medicine. Follow their  instructions.  Where can I get more information?  [facility] - About COVID-19:   [email or web address]  CDC - What to Do If You're Sick: www.cdc.gov/coronavirus/2019-ncov/about/steps-when-sick.html  CDC - Ending Home Isolation: www.cdc.gov/coronavirus/2019-ncov/hcp/disposition-in-home-patients.html  CDC - Caring for Someone: www.cdc.gov/coronavirus/2019-ncov/if-you-are-sick/care-for-someone.html  [facility] - Interim Guidance for Hospital Discharge to Home: www.health[facility]/diseases/coronavirus/hcp/hospdischarge.pdf  Below are the COVID-19 hotlines at the [place] Department of [facility] ([facility]). Interpreters are available.  For health questions: Call 064-935-3023 or 1-119.848.9211 (7 a.m. to 7 p.m.)  For questions about schools and childcare: Call 622-451-5021 or 1-257.398.4525 (7 a.m. to 7 p.m.)    For informational purposes only. Not to replace the advice of your health care provider. Clinically reviewed by Dr. Oziel Choudhary.   Copyright   2020 [facility]. All rights reserved. [employer] [id number] - REV 01/05/21.

## 2022-01-10 NOTE — ED NOTES
Swab completed, sent to lab. Meds administered, pt drinking water. Tolerating well. VS monitoring. Call light in reach.

## 2022-03-15 ENCOUNTER — OFFICE VISIT (OUTPATIENT)
Dept: FAMILY MEDICINE | Facility: CLINIC | Age: 27
End: 2022-03-15
Payer: COMMERCIAL

## 2022-03-15 VITALS
RESPIRATION RATE: 18 BRPM | HEIGHT: 69 IN | TEMPERATURE: 97.4 F | WEIGHT: 265 LBS | HEART RATE: 99 BPM | DIASTOLIC BLOOD PRESSURE: 80 MMHG | BODY MASS INDEX: 39.25 KG/M2 | SYSTOLIC BLOOD PRESSURE: 112 MMHG | OXYGEN SATURATION: 98 %

## 2022-03-15 DIAGNOSIS — L05.01 PILONIDAL CYST WITH ABSCESS: Primary | ICD-10-CM

## 2022-03-15 PROCEDURE — 99213 OFFICE O/P EST LOW 20 MIN: CPT | Performed by: FAMILY MEDICINE

## 2022-03-15 RX ORDER — CEPHALEXIN 500 MG/1
500 CAPSULE ORAL 2 TIMES DAILY
Qty: 20 CAPSULE | Refills: 0 | Status: SHIPPED | OUTPATIENT
Start: 2022-03-15 | End: 2022-03-25

## 2022-03-15 ASSESSMENT — PAIN SCALES - GENERAL: PAINLEVEL: EXTREME PAIN (8)

## 2022-03-15 NOTE — PROGRESS NOTES
"  Assessment & Plan     (L05.01) Pilonidal cyst with abscess  (primary encounter diagnosis)  Comment: swelling  Plan: cephALEXin (KEFLEX) 500 MG capsule        I explained to the patient what a pilonidal cyst is and how they develop.  Regular treat with Keflex for 10 days and have her warm pack it either in the tub or with a heating pad for 10 to 15 minutes at a time throughout the day as she can.  I did warn her that it could come to ahead and drain which will relieve a lot of that pressure.  If it persists or she continues to have problems with that she may need to see general surgery to have this opened up and excised.  She will keep us posted.        20 minutes spent on the date of the encounter doing chart review, history and exam, documentation and further activities per the note       BMI:   Estimated body mass index is 39.13 kg/m  as calculated from the following:    Height as of this encounter: 1.753 m (5' 9\").    Weight as of this encounter: 120.2 kg (265 lb).   Weight management plan: Not addressed today.        Return if symptoms worsen or fail to improve.    Rick Degroot MD, MD  [facility]    Blessing Zaragoza is a 26 year old who presents for the following health issues     History of Present Illness       Reason for visit:  Tailbone swelling & pain  Symptom onset:  3-7 days ago  Symptoms include:  Hurt to touch then sunday it was swollen and more painful  Symptom intensity:  Severe  Symptom progression:  Worsening  Had these symptoms before:  No    She eats 0-1 servings of fruits and vegetables daily.She consumes 0 sweetened beverage(s) daily.She exercises with enough effort to increase her heart rate 30 to 60 minutes per day.  She exercises with enough effort to increase her heart rate 3 or less days per week.   She is taking medications regularly.       Started this past Saturday with pain she noted swelling that started around the same time.  She had some achiness for " "couple days prior to that but nothing significant.  No trauma to the area and she has never had this previously.  I questioned her about any family history of pilonidal cyst or abscess and she had no idea if there was one.  She has had a little bit of crustiness forming in the cleft of her butt cheeks but no significant drainage noted.      Review of Systems   Constitutional, HEENT, cardiovascular, pulmonary, gi and gu systems are negative, except as otherwise noted.      Objective    /80   Pulse 99   Temp 97.4  F (36.3  C) (Temporal)   Resp 18   Ht 1.753 m (5' 9\")   Wt 120.2 kg (265 lb)   SpO2 98%   BMI 39.13 kg/m    Body mass index is 39.13 kg/m .  Physical Exam   GENERAL: healthy, alert and no distress  Buttock region: She has swelling just above the cleft of her wet prep.  This is significantly tender to palpation.  There is actually induration and tenderness in the upper medial buttock cheeks bilaterally near the cleft.  When I go into the cleft area is exceedingly tender and mildly erythematous.  This is all consistent with an early pilonidal abscess.  There is no area of pointing or draining.                "

## 2022-03-15 NOTE — LETTER
[address]   [address]  Tel. [phone] / Fax [phone]    March 15, 2022        Mila Oden  [address] [facility] [address]          To whom it may concern;    Ms Oden was seen in the office today.  She had to take the day off so please excuse her absence.    She should be able to return to work 3/16/22 without restrictions.    Sincerely,        Rick Degroot MD

## 2022-03-18 ENCOUNTER — TELEPHONE (OUTPATIENT)
Dept: FAMILY MEDICINE | Facility: CLINIC | Age: 27
End: 2022-03-18
Payer: COMMERCIAL

## 2022-03-18 NOTE — TELEPHONE ENCOUNTER
Patient is calling to report she thinks she is having an allergic reaction to the Keflex she was prescribed on 3/15/22.  She started to have vomiting, chills, sweating, and body aches with sore throat yesterday.  She has not taken her temperature, but believes she has a fever.  She has taken OTC fever reducer, so cannot take it now.    Message handled by Nurse Triage with [facility] - provider name: Marisa Menajimmy.  This does not sound like an allergic reaction to Keflex.    Patient is informed.  Closing this encounter.  Jessie Vasquez, ILIANAN, RN

## 2024-12-19 NOTE — NURSING NOTE
"Chief Complaint   Patient presents with     Prenatal Care     37w3d     Estimated body mass index is 38.25 kg/(m^2) as calculated from the following:    Height as of this encounter: 5' 9\" (1.753 m).    Weight as of this encounter: 259 lb (117.5 kg).  BP Readings from Last 1 Encounters:   07/23/18 120/70   ]  BP cuff size:  large  Akanksha Scales CMA (AAMA)     " [Joint Pain] : joint pain [Joint Stiffness] : joint stiffness

## 2025-06-18 ENCOUNTER — TELEPHONE (OUTPATIENT)
Dept: FAMILY MEDICINE | Facility: CLINIC | Age: 30
End: 2025-06-18
Payer: COMMERCIAL

## 2025-06-18 NOTE — TELEPHONE ENCOUNTER
Patient returned call regarding IUD. She had the Kyleena placed in May 2020. Please advise if she needs upcoming appt on 6/202/25.    Peng Portillo RN on 6/18/2025 at 4:47 PM

## 2025-06-23 ENCOUNTER — RESULTS FOLLOW-UP (OUTPATIENT)
Dept: OBGYN | Facility: CLINIC | Age: 30
End: 2025-06-23

## 2025-06-23 DIAGNOSIS — R87.810 ASCUS WITH POSITIVE HIGH RISK HPV CERVICAL: Primary | ICD-10-CM

## 2025-06-23 DIAGNOSIS — R87.610 ASCUS WITH POSITIVE HIGH RISK HPV CERVICAL: Primary | ICD-10-CM

## 2025-07-01 ENCOUNTER — PATIENT OUTREACH (OUTPATIENT)
Dept: FAMILY MEDICINE | Facility: CLINIC | Age: 30
End: 2025-07-01
Payer: COMMERCIAL

## 2025-07-01 PROBLEM — R87.610 ASCUS WITH POSITIVE HIGH RISK HPV CERVICAL: Status: ACTIVE | Noted: 2025-06-20

## 2025-07-01 PROBLEM — R87.810 ASCUS WITH POSITIVE HIGH RISK HPV CERVICAL: Status: ACTIVE | Noted: 2025-06-20

## 2025-07-01 NOTE — LETTER
July 2, 2025      Mila Tompkinsayaka  [id number] [facility] [address]        Dear ,    Thanks for taking the time to discuss your result with me over the phone recently.    Your results showed Atypical Squamous Cells of Undetermined Significance (ASCUS) and HPV positive.     HPV is a common virus found in sexually active men and women. There are many strains of HPV, but we test Pap samples for the high-risk types. High-risk HPV can be the cause of an abnormal Pap smear result and can also be a risk factor for later development of cervical cancer if not monitored and/or treated appropriately.    Because of these results, your provider has recommended that you have a colposcopy procedure. A colposcopy procedure allows the provider to more closely examine your vulva, vagina and cervix. If a problem is seen during the colposcopy, a small sample of tissue (biopsy) may be collected for laboratory testing. The results will be complete within two weeks and will be used to determine the plan for future follow-up.     Please call [phone] to schedule this procedure with Family Practice, or [phone] to schedule with OB/GYN (this cannot be scheduled through [facility]). Schedule this for a time when you are not due to have a period (if having regular menstrual bleeding). No unprotected sex for 2 weeks prior to the colposcopy; a pregnancy test will be requested when you arrive for your appointment. Nothing in the vagina for 24 hours before your colposcopy (no sex, douches, vaginal medications or lubricants). You can take an over-the-counter pain reliever 1 hour before your appointment.    If you have additional questions regarding this result, please contact our Registered Nurse, Shannon, at [phone].      Sincerely,    Your [facility] Care Team

## 2025-07-21 ENCOUNTER — PATIENT OUTREACH (OUTPATIENT)
Dept: CARE COORDINATION | Facility: CLINIC | Age: 30
End: 2025-07-21
Payer: COMMERCIAL

## 2025-08-11 ENCOUNTER — OFFICE VISIT (OUTPATIENT)
Dept: FAMILY MEDICINE | Facility: CLINIC | Age: 30
End: 2025-08-11
Payer: COMMERCIAL

## 2025-08-11 ENCOUNTER — RESULTS FOLLOW-UP (OUTPATIENT)
Dept: FAMILY MEDICINE | Facility: CLINIC | Age: 30
End: 2025-08-11

## 2025-08-11 VITALS
HEART RATE: 65 BPM | TEMPERATURE: 97.5 F | WEIGHT: 275.8 LBS | DIASTOLIC BLOOD PRESSURE: 79 MMHG | HEIGHT: 69 IN | OXYGEN SATURATION: 98 % | RESPIRATION RATE: 12 BRPM | BODY MASS INDEX: 40.85 KG/M2 | SYSTOLIC BLOOD PRESSURE: 122 MMHG

## 2025-08-11 DIAGNOSIS — B96.89 BACTERIAL VAGINOSIS: ICD-10-CM

## 2025-08-11 DIAGNOSIS — R87.810 ASCUS WITH POSITIVE HIGH RISK HPV CERVICAL: ICD-10-CM

## 2025-08-11 DIAGNOSIS — Z30.431 CHECKING OF INTRAUTERINE DEVICE: ICD-10-CM

## 2025-08-11 DIAGNOSIS — R87.610 ASCUS WITH POSITIVE HIGH RISK HPV CERVICAL: ICD-10-CM

## 2025-08-11 DIAGNOSIS — Z00.00 ROUTINE GENERAL MEDICAL EXAMINATION AT A HEALTH CARE FACILITY: Primary | ICD-10-CM

## 2025-08-11 DIAGNOSIS — N76.0 BACTERIAL VAGINOSIS: ICD-10-CM

## 2025-08-11 DIAGNOSIS — Z97.5 IUD (INTRAUTERINE DEVICE) IN PLACE: ICD-10-CM

## 2025-08-11 DIAGNOSIS — N89.8 VAGINAL DISCHARGE: ICD-10-CM

## 2025-08-11 DIAGNOSIS — Z53.20 SCREENING DECLINED BY PATIENT: ICD-10-CM

## 2025-08-11 DIAGNOSIS — E66.01 MORBID OBESITY (H): ICD-10-CM

## 2025-08-11 DIAGNOSIS — Z28.21 COVID-19 VACCINATION DECLINED: ICD-10-CM

## 2025-08-11 LAB
CLUE CELLS: PRESENT
TRICHOMONAS, WET PREP: ABNORMAL
WBC'S/HIGH POWER FIELD, WET PREP: ABNORMAL
YEAST, WET PREP: ABNORMAL

## 2025-08-11 PROCEDURE — 99395 PREV VISIT EST AGE 18-39: CPT | Mod: 25 | Performed by: STUDENT IN AN ORGANIZED HEALTH CARE EDUCATION/TRAINING PROGRAM

## 2025-08-11 PROCEDURE — 3074F SYST BP LT 130 MM HG: CPT | Performed by: STUDENT IN AN ORGANIZED HEALTH CARE EDUCATION/TRAINING PROGRAM

## 2025-08-11 PROCEDURE — 3078F DIAST BP <80 MM HG: CPT | Performed by: STUDENT IN AN ORGANIZED HEALTH CARE EDUCATION/TRAINING PROGRAM

## 2025-08-11 PROCEDURE — 1126F AMNT PAIN NOTED NONE PRSNT: CPT | Performed by: STUDENT IN AN ORGANIZED HEALTH CARE EDUCATION/TRAINING PROGRAM

## 2025-08-11 PROCEDURE — 87210 SMEAR WET MOUNT SALINE/INK: CPT | Performed by: STUDENT IN AN ORGANIZED HEALTH CARE EDUCATION/TRAINING PROGRAM

## 2025-08-11 PROCEDURE — 99213 OFFICE O/P EST LOW 20 MIN: CPT | Mod: 25 | Performed by: STUDENT IN AN ORGANIZED HEALTH CARE EDUCATION/TRAINING PROGRAM

## 2025-08-11 RX ORDER — METRONIDAZOLE 500 MG/1
500 TABLET ORAL 2 TIMES DAILY
Qty: 14 TABLET | Refills: 0 | Status: SHIPPED | OUTPATIENT
Start: 2025-08-11 | End: 2025-08-18

## 2025-08-11 SDOH — HEALTH STABILITY: PHYSICAL HEALTH: ON AVERAGE, HOW MANY DAYS PER WEEK DO YOU ENGAGE IN MODERATE TO STRENUOUS EXERCISE (LIKE A BRISK WALK)?: 5 DAYS

## 2025-08-11 ASSESSMENT — SOCIAL DETERMINANTS OF HEALTH (SDOH): HOW OFTEN DO YOU GET TOGETHER WITH FRIENDS OR RELATIVES?: PATIENT DECLINED

## 2025-08-11 ASSESSMENT — PAIN SCALES - GENERAL: PAINLEVEL_OUTOF10: NO PAIN (0)
